# Patient Record
Sex: FEMALE | Race: ASIAN | Employment: OTHER | URBAN - METROPOLITAN AREA
[De-identification: names, ages, dates, MRNs, and addresses within clinical notes are randomized per-mention and may not be internally consistent; named-entity substitution may affect disease eponyms.]

---

## 2017-01-16 RX ORDER — ALENDRONATE SODIUM 70 MG/1
TABLET ORAL
Qty: 12 TABLET | Refills: 0 | Status: SHIPPED | OUTPATIENT
Start: 2017-01-16 | End: 2017-11-27

## 2017-01-18 RX ORDER — ALENDRONATE SODIUM 70 MG/1
TABLET ORAL
Qty: 12 TABLET | Refills: 0 | OUTPATIENT
Start: 2017-01-18

## 2017-01-20 RX ORDER — SERTRALINE HYDROCHLORIDE 25 MG/1
TABLET, FILM COATED ORAL
Qty: 90 TABLET | Refills: 0 | Status: SHIPPED | OUTPATIENT
Start: 2017-01-20 | End: 2017-11-27

## 2017-04-12 RX ORDER — ALENDRONATE SODIUM 70 MG/1
TABLET ORAL
Qty: 12 TABLET | Refills: 0 | Status: SHIPPED | OUTPATIENT
Start: 2017-04-12 | End: 2017-04-19

## 2017-04-18 RX ORDER — SERTRALINE HYDROCHLORIDE 25 MG/1
TABLET, FILM COATED ORAL
Qty: 90 TABLET | Refills: 0 | Status: SHIPPED | OUTPATIENT
Start: 2017-04-18 | End: 2017-04-19

## 2017-04-18 NOTE — TELEPHONE ENCOUNTER
Not protocol medication. LOV 11-14-16. Last refill 1-20-17. Next appointment not yet made. Please see pending Rx. Refill if appropriate. Thanks.

## 2017-04-19 ENCOUNTER — OFFICE VISIT (OUTPATIENT)
Dept: HEMATOLOGY/ONCOLOGY | Facility: HOSPITAL | Age: 62
End: 2017-04-19
Attending: INTERNAL MEDICINE
Payer: COMMERCIAL

## 2017-04-19 VITALS
WEIGHT: 107.63 LBS | OXYGEN SATURATION: 98 % | HEIGHT: 62.01 IN | HEART RATE: 81 BPM | BODY MASS INDEX: 19.56 KG/M2 | DIASTOLIC BLOOD PRESSURE: 61 MMHG | TEMPERATURE: 99 F | RESPIRATION RATE: 18 BRPM | SYSTOLIC BLOOD PRESSURE: 106 MMHG

## 2017-04-19 DIAGNOSIS — M81.0 OSTEOPOROSIS, UNSPECIFIED OSTEOPOROSIS TYPE, UNSPECIFIED PATHOLOGICAL FRACTURE PRESENCE: ICD-10-CM

## 2017-04-19 DIAGNOSIS — C50.212 MALIGNANT NEOPLASM OF UPPER-INNER QUADRANT OF LEFT FEMALE BREAST (HCC): Primary | ICD-10-CM

## 2017-04-19 PROCEDURE — 99213 OFFICE O/P EST LOW 20 MIN: CPT | Performed by: INTERNAL MEDICINE

## 2017-04-19 NOTE — PROGRESS NOTES
[facility] Progress Note      Patient Name:  Deisy Stokes  YOB: 1955  Medical Record Number:  [id number]    Encounter Date: 4/19/2017    CHIEF COMPLAINT: Stage I left breast carcinoma status post lumpectomy     HPI:     64 yea ([id number] [facility]) 2.5 MG Oral Tab TAKE ONE TABLET BY MOUTH ONCE DAILY. Disp: 90 tablet Rfl: 3   Calcium Carbonate-Vitamin D (CALCIUM + D OR) Take 1 tablet by mouth 2 (two) times daily.  Disp:  Rfl:    Multiple Vitamins-Minerals (CENTRUM SILVER ULTRA WOMENS) Oral Tab DIAGNOSTIC BILATERAL (CPT=77066)  XR DEXA BONE DENSITOMETRY (CPT=77080)    Return in about 4 months (around 8/19/2017) for MD. She prefers 4 month follow up even though I offered 6m. Kenji Childers M.D.     [facility]    [facility]

## 2017-04-19 NOTE — PROGRESS NOTES
Pt here for 4 month MD f/u for hx of breast ca. Pt continues on Letrozole daily without complaint. Pt states she is feeling well. Last mammogram done 9/7/16; last Dexa done 8/31/16.      Education Record    Learner:  Patient    Disease / Diagnosis: LB ca

## 2017-07-21 RX ORDER — SERTRALINE HYDROCHLORIDE 25 MG/1
TABLET, FILM COATED ORAL
Qty: 90 TABLET | Refills: 0 | Status: SHIPPED | OUTPATIENT
Start: 2017-07-21 | End: 2017-10-23

## 2017-08-10 RX ORDER — ALENDRONATE SODIUM 70 MG/1
TABLET ORAL
Qty: 12 TABLET | Refills: 0 | Status: SHIPPED | OUTPATIENT
Start: 2017-08-10 | End: 2017-11-27 | Stop reason: ALTCHOICE

## 2017-08-10 RX ORDER — ALENDRONATE SODIUM 70 MG/1
TABLET ORAL
Qty: 4 TABLET | Refills: 0 | Status: SHIPPED | OUTPATIENT
Start: 2017-08-10 | End: 2017-08-10

## 2017-08-16 ENCOUNTER — OFFICE VISIT (OUTPATIENT)
Dept: HEMATOLOGY/ONCOLOGY | Facility: HOSPITAL | Age: 62
End: 2017-08-16
Attending: INTERNAL MEDICINE
Payer: COMMERCIAL

## 2017-08-16 VITALS
WEIGHT: 105.19 LBS | SYSTOLIC BLOOD PRESSURE: 86 MMHG | HEIGHT: 62.01 IN | HEART RATE: 70 BPM | TEMPERATURE: 98 F | RESPIRATION RATE: 18 BRPM | BODY MASS INDEX: 19.11 KG/M2 | OXYGEN SATURATION: 99 % | DIASTOLIC BLOOD PRESSURE: 46 MMHG

## 2017-08-16 DIAGNOSIS — C50.212 MALIGNANT NEOPLASM OF UPPER-INNER QUADRANT OF LEFT BREAST IN FEMALE, ESTROGEN RECEPTOR POSITIVE (HCC): Primary | ICD-10-CM

## 2017-08-16 DIAGNOSIS — Z17.0 MALIGNANT NEOPLASM OF UPPER-INNER QUADRANT OF LEFT BREAST IN FEMALE, ESTROGEN RECEPTOR POSITIVE (HCC): Primary | ICD-10-CM

## 2017-08-16 PROCEDURE — 99213 OFFICE O/P EST LOW 20 MIN: CPT | Performed by: INTERNAL MEDICINE

## 2017-08-16 NOTE — PROGRESS NOTES
[facility] Progress Note      Patient Name:  Robert Reese  YOB: 1955  Medical Record Number:  [id number]    Encounter Date: 8/16/2017    CHIEF COMPLAINT: Stage I left breast carcinoma status post lumpectomy     HPI:     64 timothya TABLET BY MOUTH ONCE DAILY. Disp: 90 tablet Rfl: 3   Calcium Carbonate-Vitamin D (CALCIUM + D OR) Take 1 tablet by mouth 2 (two) times daily.  Disp:  Rfl:    Multiple Vitamins-Minerals (CENTRUM SILVER ULTRA WOMENS) Oral Tab None Entered Disp:  Rfl:        V

## 2017-08-16 NOTE — PROGRESS NOTES
Patient here for 4 month follow up. Patient states, \"no changes. \" Instructed patient to change into gown.     Education Record    Learner:  Patient    Disease / Diagnosis: Breast CA    Barriers / Limitations:  None   Comments:    Method:  Brief focused

## 2017-09-01 ENCOUNTER — TELEPHONE (OUTPATIENT)
Dept: HEMATOLOGY/ONCOLOGY | Facility: HOSPITAL | Age: 62
End: 2017-09-01

## 2017-09-01 ENCOUNTER — HOSPITAL ENCOUNTER (OUTPATIENT)
Dept: BONE DENSITY | Age: 62
Discharge: HOME | End: 2017-09-01
Attending: INTERNAL MEDICINE
Payer: COMMERCIAL

## 2017-09-01 DIAGNOSIS — M81.0 OSTEOPOROSIS, UNSPECIFIED OSTEOPOROSIS TYPE, UNSPECIFIED PATHOLOGICAL FRACTURE PRESENCE: ICD-10-CM

## 2017-09-01 DIAGNOSIS — C50.212 MALIGNANT NEOPLASM OF UPPER-INNER QUADRANT OF LEFT FEMALE BREAST (HCC): ICD-10-CM

## 2017-09-01 PROCEDURE — 77080 DXA BONE DENSITY AXIAL: CPT | Performed by: INTERNAL MEDICINE

## 2017-09-01 NOTE — TELEPHONE ENCOUNTER
Nikhil Ruby MD  [facility]             Call, DEXA stable      Left VM with results.

## 2017-09-07 ENCOUNTER — HOSPITAL ENCOUNTER (OUTPATIENT)
Dept: MAMMOGRAPHY | Facility: HOSPITAL | Age: 62
Discharge: HOME | End: 2017-09-07
Attending: INTERNAL MEDICINE
Payer: COMMERCIAL

## 2017-09-07 ENCOUNTER — TELEPHONE (OUTPATIENT)
Dept: HEMATOLOGY/ONCOLOGY | Facility: HOSPITAL | Age: 62
End: 2017-09-07

## 2017-09-07 DIAGNOSIS — M81.0 OSTEOPOROSIS, UNSPECIFIED OSTEOPOROSIS TYPE, UNSPECIFIED PATHOLOGICAL FRACTURE PRESENCE: ICD-10-CM

## 2017-09-07 DIAGNOSIS — C50.212 MALIGNANT NEOPLASM OF UPPER-INNER QUADRANT OF LEFT FEMALE BREAST (HCC): ICD-10-CM

## 2017-09-07 PROCEDURE — 77066 DX MAMMO INCL CAD BI: CPT | Performed by: INTERNAL MEDICINE

## 2017-09-07 NOTE — TELEPHONE ENCOUNTER
Ankit Roa MD  [facility] [address] [facility]             Call, claudy sharma      Pt informed.

## 2017-10-23 RX ORDER — SERTRALINE HYDROCHLORIDE 25 MG/1
TABLET, FILM COATED ORAL
Qty: 90 TABLET | Refills: 0 | Status: SHIPPED | OUTPATIENT
Start: 2017-10-23 | End: 2017-11-27

## 2017-10-23 NOTE — TELEPHONE ENCOUNTER
This was in Dr Linn swartz. Pt sees you. Has appt 11/27  I changed Ovidio Fernandes to you if you agree.

## 2017-11-27 ENCOUNTER — OFFICE VISIT (OUTPATIENT)
Dept: FAMILY MEDICINE CLINIC | Facility: CLINIC | Age: 62
End: 2017-11-27

## 2017-11-27 VITALS
SYSTOLIC BLOOD PRESSURE: 102 MMHG | BODY MASS INDEX: 19.88 KG/M2 | DIASTOLIC BLOOD PRESSURE: 48 MMHG | HEART RATE: 70 BPM | WEIGHT: 108 LBS | TEMPERATURE: 98 F | HEIGHT: 62 IN | RESPIRATION RATE: 16 BRPM

## 2017-11-27 DIAGNOSIS — Z13.89 SCREENING FOR GENITOURINARY CONDITION: ICD-10-CM

## 2017-11-27 DIAGNOSIS — E55.9 VITAMIN D DEFICIENCY: ICD-10-CM

## 2017-11-27 DIAGNOSIS — Z00.00 PHYSICAL EXAM, ANNUAL: Primary | ICD-10-CM

## 2017-11-27 DIAGNOSIS — Z01.419 CERVICAL SMEAR, AS PART OF ROUTINE GYNECOLOGICAL EXAMINATION: ICD-10-CM

## 2017-11-27 DIAGNOSIS — Z12.11 SCREEN FOR COLON CANCER: ICD-10-CM

## 2017-11-27 DIAGNOSIS — Z00.00 LABORATORY EXAMINATION ORDERED AS PART OF A ROUTINE GENERAL MEDICAL EXAMINATION: ICD-10-CM

## 2017-11-27 PROCEDURE — 88175 CYTOPATH C/V AUTO FLUID REDO: CPT | Performed by: FAMILY MEDICINE

## 2017-11-27 PROCEDURE — 99396 PREV VISIT EST AGE 40-64: CPT | Performed by: FAMILY MEDICINE

## 2017-11-27 PROCEDURE — 87624 HPV HI-RISK TYP POOLED RSLT: CPT | Performed by: FAMILY MEDICINE

## 2017-11-27 PROCEDURE — 81003 URINALYSIS AUTO W/O SCOPE: CPT | Performed by: FAMILY MEDICINE

## 2017-11-27 RX ORDER — SERTRALINE HYDROCHLORIDE 25 MG/1
25 TABLET, FILM COATED ORAL
Qty: 90 TABLET | Refills: 1 | Status: SHIPPED | OUTPATIENT
Start: 2017-11-27 | End: 2018-08-25

## 2017-11-27 RX ORDER — ALENDRONATE SODIUM 70 MG/1
TABLET ORAL
Qty: 12 TABLET | Refills: 0 | OUTPATIENT
Start: 2017-11-27

## 2017-11-27 RX ORDER — ALENDRONATE SODIUM 70 MG/1
TABLET ORAL
Qty: 12 TABLET | Refills: 1 | Status: SHIPPED | OUTPATIENT
Start: 2017-11-27 | End: 2018-05-13

## 2017-11-27 NOTE — PATIENT INSTRUCTIONS
Healthy diet. Stay active. Continue current medications. Return in 6 months for medication check. Do test for occult blood from the stool. Call GI for colonoscopy.

## 2017-11-27 NOTE — PROGRESS NOTES
HPI:   Joan Quispe is a 58year old female who presents for a complete physical exam. Symptoms: denies discharge, itching, burning or dysuria. Patient has no complains . Needs refill of her medications doing well on them.          Immunization Histor OR Take by mouth. Disp:  Rfl:    Sertraline HCl (ZOLOFT) 25 MG Oral Tab Take 1 tablet (25 mg total) by mouth once daily. Disp: 90 tablet Rfl: 1   letrozole (FEMARA) 2.5 MG Oral Tab TAKE ONE TABLET BY MOUTH ONCE DAILY.  Disp: 90 tablet Rfl: 3   Calcium Carbo denies dysuria, vaginal discharge or itching, is menopausal.  MUSCULOSKELETAL: denies back pain  NEURO: denies headaches  PSYCHE: denies depression or anxiety  HEMATOLOGIC: denies hx of anemia  ENDOCRINE: denies thyroid history  ALL/ASTHMA: denies hx of al EVERY WEEK      Sertraline HCl 25 MG Oral Tab 90 tablet 1      Sig: Take 1 tablet (25 mg total) by mouth once daily. Healthy diet. Stay active. Continue current medications. Return in 6 months for medication check.   Do test for occult blood from t

## 2017-12-01 ENCOUNTER — LAB ENCOUNTER (OUTPATIENT)
Dept: LAB | Age: 62
End: 2017-12-01
Attending: FAMILY MEDICINE
Payer: COMMERCIAL

## 2017-12-01 DIAGNOSIS — Z00.00 LABORATORY EXAMINATION ORDERED AS PART OF A ROUTINE GENERAL MEDICAL EXAMINATION: ICD-10-CM

## 2017-12-01 DIAGNOSIS — E55.9 VITAMIN D DEFICIENCY: ICD-10-CM

## 2017-12-01 PROCEDURE — 80061 LIPID PANEL: CPT | Performed by: FAMILY MEDICINE

## 2017-12-01 PROCEDURE — 36415 COLL VENOUS BLD VENIPUNCTURE: CPT | Performed by: FAMILY MEDICINE

## 2017-12-01 PROCEDURE — 82306 VITAMIN D 25 HYDROXY: CPT | Performed by: FAMILY MEDICINE

## 2017-12-01 PROCEDURE — 80050 GENERAL HEALTH PANEL: CPT | Performed by: FAMILY MEDICINE

## 2017-12-11 ENCOUNTER — OFFICE VISIT (OUTPATIENT)
Dept: FAMILY MEDICINE CLINIC | Facility: CLINIC | Age: 62
End: 2017-12-11

## 2017-12-11 VITALS
HEART RATE: 82 BPM | DIASTOLIC BLOOD PRESSURE: 60 MMHG | TEMPERATURE: 99 F | BODY MASS INDEX: 19.88 KG/M2 | HEIGHT: 62 IN | SYSTOLIC BLOOD PRESSURE: 98 MMHG | WEIGHT: 108 LBS | RESPIRATION RATE: 16 BRPM

## 2017-12-11 DIAGNOSIS — M65.311 TRIGGER FINGER OF RIGHT THUMB: Primary | ICD-10-CM

## 2017-12-11 DIAGNOSIS — M79.644 PAIN OF RIGHT THUMB: ICD-10-CM

## 2017-12-11 PROCEDURE — 99213 OFFICE O/P EST LOW 20 MIN: CPT | Performed by: FAMILY MEDICINE

## 2017-12-11 NOTE — PROGRESS NOTES
Yonny Healy is a 58year old female. Cc right thumb pain  HPI:   Patient is coming to the office for evaluation of the right thumb pain which started after Thanksgiving she was peeling potatoes at that time. The pain is at the base of the thumb.   Wo Position: Sitting, Cuff Size: adult)   Pulse 82   Temp 98.6 °F (37 °C) (Oral)   Resp 16   Ht 62\"   Wt 108 lb   Breastfeeding?  No   BMI 19.75 kg/m²   GENERAL: well developed, well nourished,in no apparent distress  SKIN: no rashes,no suspicious lesions  HE

## 2017-12-13 ENCOUNTER — OFFICE VISIT (OUTPATIENT)
Dept: HEMATOLOGY/ONCOLOGY | Facility: HOSPITAL | Age: 62
End: 2017-12-13
Attending: INTERNAL MEDICINE
Payer: COMMERCIAL

## 2017-12-13 VITALS
WEIGHT: 109.19 LBS | SYSTOLIC BLOOD PRESSURE: 101 MMHG | RESPIRATION RATE: 18 BRPM | OXYGEN SATURATION: 97 % | TEMPERATURE: 98 F | HEART RATE: 83 BPM | HEIGHT: 62.01 IN | DIASTOLIC BLOOD PRESSURE: 66 MMHG | BODY MASS INDEX: 19.84 KG/M2

## 2017-12-13 DIAGNOSIS — Z17.0 MALIGNANT NEOPLASM OF UPPER-INNER QUADRANT OF LEFT BREAST IN FEMALE, ESTROGEN RECEPTOR POSITIVE (HCC): Primary | ICD-10-CM

## 2017-12-13 DIAGNOSIS — C50.212 MALIGNANT NEOPLASM OF UPPER-INNER QUADRANT OF LEFT BREAST IN FEMALE, ESTROGEN RECEPTOR POSITIVE (HCC): Primary | ICD-10-CM

## 2017-12-13 PROBLEM — M65.311 TRIGGER FINGER OF RIGHT THUMB: Status: ACTIVE | Noted: 2017-12-13

## 2017-12-13 PROCEDURE — 99213 OFFICE O/P EST LOW 20 MIN: CPT | Performed by: INTERNAL MEDICINE

## 2017-12-13 NOTE — PROGRESS NOTES
Pt her for 4 month MD f/u for h/o LB ca. Pt states she feels well and denies any complaints at this time.      Education Record    Learner:  Patient    Disease / Diagnosis:    Barriers / Limitations:  None   Comments:    Method:  Discussion   Comments:    KAYLENE

## 2017-12-13 NOTE — PROGRESS NOTES
[facility] Progress Note      Patient Name:  Nirmala Myrick  YOB: 1955  Medical Record Number:  [id number]    Encounter Date: 12/13/2017    CHIEF COMPLAINT: Stage I left breast carcinoma status post lumpectomy     HPI:     58 ye 1.48 sq meters (1002)  Pulse: 83 (1002)  BP: 101/66 (1002)  Temp: 98.4 °F (36.9 °C) (1002)  Do Not Use - Resp Rate: --  SpO2: 97 % (1002)    PS:  ECO    PHYSICAL EXAM:    General: alert and oriented x 3, not in acute distr

## 2018-01-22 RX ORDER — SERTRALINE HYDROCHLORIDE 25 MG/1
TABLET, FILM COATED ORAL
Qty: 90 TABLET | Refills: 0 | OUTPATIENT
Start: 2018-01-22

## 2018-04-11 ENCOUNTER — OFFICE VISIT (OUTPATIENT)
Dept: HEMATOLOGY/ONCOLOGY | Facility: HOSPITAL | Age: 63
End: 2018-04-11
Attending: INTERNAL MEDICINE
Payer: COMMERCIAL

## 2018-04-11 VITALS
WEIGHT: 109 LBS | RESPIRATION RATE: 18 BRPM | HEART RATE: 85 BPM | BODY MASS INDEX: 20 KG/M2 | SYSTOLIC BLOOD PRESSURE: 97 MMHG | TEMPERATURE: 98 F | OXYGEN SATURATION: 96 % | DIASTOLIC BLOOD PRESSURE: 58 MMHG

## 2018-04-11 DIAGNOSIS — Z17.0 MALIGNANT NEOPLASM OF UPPER-INNER QUADRANT OF LEFT BREAST IN FEMALE, ESTROGEN RECEPTOR POSITIVE (HCC): Primary | ICD-10-CM

## 2018-04-11 DIAGNOSIS — C50.212 MALIGNANT NEOPLASM OF UPPER-INNER QUADRANT OF LEFT BREAST IN FEMALE, ESTROGEN RECEPTOR POSITIVE (HCC): Primary | ICD-10-CM

## 2018-04-11 DIAGNOSIS — M81.8 OTHER OSTEOPOROSIS, UNSPECIFIED PATHOLOGICAL FRACTURE PRESENCE: ICD-10-CM

## 2018-04-11 PROCEDURE — 99213 OFFICE O/P EST LOW 20 MIN: CPT | Performed by: INTERNAL MEDICINE

## 2018-04-11 NOTE — PROGRESS NOTES
Patient here for 4 month follow up. Patient feeling good, no changes. Patient instructed to change into a gown.      Education Record    Learner:  Patient    Disease / Diagnosis: Breast    Barriers / Limitations:  None   Comments:    Method:  Discussion   C

## 2018-04-11 NOTE — PROGRESS NOTES
[facility] Progress Note      Patient Name:  Pari Dixon  YOB: 1955  Medical Record Number:  [id number]    Encounter Date: 4/11/2018    CHIEF COMPLAINT: Stage I left breast carcinoma status post lumpectomy     HPI:     58 yea --  SpO2: --    PS:  ECO    PHYSICAL EXAM:    General: alert and oriented x 3, not in acute distress. HEENT: JAMES, oropharynx  clear. Neck: supple. No JVD /adenopathy. CVS: S1S2, regular  Rhythm, no murmurs.    Lungs: Clear to auscultation and perc

## 2018-05-14 RX ORDER — ALENDRONATE SODIUM 70 MG/1
TABLET ORAL
Qty: 12 TABLET | Refills: 0 | Status: SHIPPED | OUTPATIENT
Start: 2018-05-14 | End: 2018-08-04

## 2018-08-06 RX ORDER — ALENDRONATE SODIUM 70 MG/1
TABLET ORAL
Qty: 12 TABLET | Refills: 0 | Status: SHIPPED | OUTPATIENT
Start: 2018-08-06 | End: 2018-10-25

## 2018-08-15 ENCOUNTER — OFFICE VISIT (OUTPATIENT)
Dept: HEMATOLOGY/ONCOLOGY | Facility: HOSPITAL | Age: 63
End: 2018-08-15
Attending: INTERNAL MEDICINE
Payer: COMMERCIAL

## 2018-08-15 VITALS
BODY MASS INDEX: 19.62 KG/M2 | TEMPERATURE: 98 F | DIASTOLIC BLOOD PRESSURE: 67 MMHG | WEIGHT: 108 LBS | OXYGEN SATURATION: 98 % | HEIGHT: 62.01 IN | RESPIRATION RATE: 18 BRPM | HEART RATE: 83 BPM | SYSTOLIC BLOOD PRESSURE: 104 MMHG

## 2018-08-15 DIAGNOSIS — Z17.0 MALIGNANT NEOPLASM OF UPPER-INNER QUADRANT OF LEFT BREAST IN FEMALE, ESTROGEN RECEPTOR POSITIVE (HCC): Primary | ICD-10-CM

## 2018-08-15 DIAGNOSIS — M89.9 BONE DISORDER: ICD-10-CM

## 2018-08-15 DIAGNOSIS — C50.212 MALIGNANT NEOPLASM OF UPPER-INNER QUADRANT OF LEFT BREAST IN FEMALE, ESTROGEN RECEPTOR POSITIVE (HCC): Primary | ICD-10-CM

## 2018-08-15 PROCEDURE — 99213 OFFICE O/P EST LOW 20 MIN: CPT | Performed by: INTERNAL MEDICINE

## 2018-08-15 NOTE — PROGRESS NOTES
[facility] Progress Note      Patient Name:  Digna Zaman  YOB: 1955  Medical Record Number:  [id number]    Encounter Date: 8/15/2018    CHIEF COMPLAINT: Stage I left breast carcinoma status post lumpectomy     HPI:     58 yea )  BSA (Calculated - sq m): 1.47 sq meters (08/15 1004)  Pulse: 83 (08/15 1004)  BP: 104/67 (08/15 1004)  Temp: 98.4 °F (36.9 °C) (08/15 1004)  Do Not Use - Resp Rate: --  SpO2: 98 % (08/15 1004)    PS:  ECO    PHYSICAL EXAM:    General: alert and

## 2018-08-15 NOTE — PROGRESS NOTES
4 month MD f/u for h/o LB ca. Pt denies any concerns or complaints at this time.    Outpatient Oncology Care Plan  Problem list:  knowledge deficit    Problems related to:    disease/disease progression    Interventions:  provided general teaching    Expect

## 2018-08-27 RX ORDER — SERTRALINE HYDROCHLORIDE 25 MG/1
TABLET, FILM COATED ORAL
Qty: 30 TABLET | Refills: 1 | Status: SHIPPED | OUTPATIENT
Start: 2018-08-27 | End: 2018-11-04

## 2018-08-27 NOTE — TELEPHONE ENCOUNTER
SERTRALINE 25MG TABLETS    Not a per protocol medication. Rx is pending for your approval.    Please sign,     Thank you    Her next appt is on 11/23/2108.

## 2018-08-29 RX ORDER — SERTRALINE HYDROCHLORIDE 25 MG/1
TABLET, FILM COATED ORAL
Qty: 90 TABLET | Refills: 1 | OUTPATIENT
Start: 2018-08-29

## 2018-09-10 ENCOUNTER — HOSPITAL ENCOUNTER (OUTPATIENT)
Dept: MAMMOGRAPHY | Facility: HOSPITAL | Age: 63
Discharge: HOME | End: 2018-09-10
Attending: INTERNAL MEDICINE
Payer: COMMERCIAL

## 2018-09-10 DIAGNOSIS — Z17.0 MALIGNANT NEOPLASM OF UPPER-INNER QUADRANT OF LEFT BREAST IN FEMALE, ESTROGEN RECEPTOR POSITIVE (HCC): ICD-10-CM

## 2018-09-10 DIAGNOSIS — C50.212 MALIGNANT NEOPLASM OF UPPER-INNER QUADRANT OF LEFT BREAST IN FEMALE, ESTROGEN RECEPTOR POSITIVE (HCC): ICD-10-CM

## 2018-09-10 DIAGNOSIS — M81.8 OTHER OSTEOPOROSIS, UNSPECIFIED PATHOLOGICAL FRACTURE PRESENCE: ICD-10-CM

## 2018-09-10 PROCEDURE — 77062 BREAST TOMOSYNTHESIS BI: CPT | Performed by: INTERNAL MEDICINE

## 2018-09-10 PROCEDURE — 77066 DX MAMMO INCL CAD BI: CPT | Performed by: INTERNAL MEDICINE

## 2018-09-11 ENCOUNTER — TELEPHONE (OUTPATIENT)
Dept: HEMATOLOGY/ONCOLOGY | Facility: HOSPITAL | Age: 63
End: 2018-09-11

## 2018-09-11 NOTE — TELEPHONE ENCOUNTER
Diana Burdick MD  [facility]             Call, claudy sharma, repeat 1 yr      Unable to reach pt by phone. Left VM with MD orders and requested a call back with any questions.

## 2018-10-25 RX ORDER — ALENDRONATE SODIUM 70 MG/1
TABLET ORAL
Qty: 12 TABLET | Refills: 0 | Status: SHIPPED | OUTPATIENT
Start: 2018-10-25 | End: 2019-02-22

## 2018-11-07 RX ORDER — SERTRALINE HYDROCHLORIDE 25 MG/1
TABLET, FILM COATED ORAL
Qty: 30 TABLET | Refills: 0 | Status: SHIPPED | OUTPATIENT
Start: 2018-11-07 | End: 2018-12-12

## 2018-11-07 NOTE — TELEPHONE ENCOUNTER
SERTRALINE 25MG TABLETS    Non protocol medication. The pt has an upcoming appt on 11/28/2018. Please see pended medications. Please sign if appropriate.       Thank you

## 2018-11-28 ENCOUNTER — OFFICE VISIT (OUTPATIENT)
Dept: FAMILY MEDICINE CLINIC | Facility: CLINIC | Age: 63
End: 2018-11-28
Payer: COMMERCIAL

## 2018-11-28 VITALS
SYSTOLIC BLOOD PRESSURE: 96 MMHG | DIASTOLIC BLOOD PRESSURE: 52 MMHG | HEIGHT: 62 IN | BODY MASS INDEX: 19.88 KG/M2 | TEMPERATURE: 98 F | HEART RATE: 78 BPM | WEIGHT: 108 LBS | RESPIRATION RATE: 16 BRPM

## 2018-11-28 DIAGNOSIS — Z00.00 PHYSICAL EXAM, ANNUAL: Primary | ICD-10-CM

## 2018-11-28 DIAGNOSIS — Z13.89 SCREENING FOR GENITOURINARY CONDITION: ICD-10-CM

## 2018-11-28 DIAGNOSIS — Z78.9 VEGETARIAN DIET: ICD-10-CM

## 2018-11-28 DIAGNOSIS — E55.9 VITAMIN D DEFICIENCY: ICD-10-CM

## 2018-11-28 DIAGNOSIS — Z00.00 LABORATORY EXAMINATION ORDERED AS PART OF A ROUTINE GENERAL MEDICAL EXAMINATION: ICD-10-CM

## 2018-11-28 DIAGNOSIS — Z12.11 SCREEN FOR COLON CANCER: ICD-10-CM

## 2018-11-28 PROCEDURE — 99396 PREV VISIT EST AGE 40-64: CPT | Performed by: FAMILY MEDICINE

## 2018-11-28 NOTE — PROGRESS NOTES
HPI:   Joan Quispe is a 61year old female who presents for a complete physical exam. Symptoms: denies discharge, itching, burning or dysuria. Patient has no complains . Needs refill of her medications doing well on them.    Pt is vegetarian did not Oral Tab TAKE 1 TABLET BY MOUTH EVERY DAY Disp: 30 tablet Rfl: 0   ALENDRONATE 70 MG Oral Tab TAKE 1 TABLET BY MOUTH EVERY WEEK Disp: 12 tablet Rfl: 0   BIOTIN OR Take by mouth.  Disp:  Rfl:    Calcium Carbonate-Vitamin D (CALCIUM + D OR) Take 1 tablet by m menopausal.  MUSCULOSKELETAL: denies back pain  NEURO: denies headaches  PSYCHE: denies depression or anxiety  HEMATOLOGIC: denies hx of anemia  ENDOCRINE: denies thyroid history  ALL/ASTHMA: denies hx of allergy or asthma    EXAM:   BP 96/52 (BP Location: Consults:  None   Pap and pelvic done. Claudio Willett done by oncologist.  Self breast exam explained. Health maintenance, will check fasting Lipids, CMP, and CBC. Pt referred for screening colonoscopy. Pt' s weight is Body mass index is 19.75 kg/m². , recommended

## 2018-11-28 NOTE — PATIENT INSTRUCTIONS
Healthy diet. Stay active. Check if your insurance covers vitamin D level for vitamin D deficiency and vitamin B12 level  for diagnosis of vegetarian diet.

## 2018-11-30 RX ORDER — SERTRALINE HYDROCHLORIDE 25 MG/1
TABLET, FILM COATED ORAL
Qty: 90 TABLET | Refills: 0 | Status: SHIPPED | OUTPATIENT
Start: 2018-11-30 | End: 2018-12-30

## 2018-11-30 NOTE — TELEPHONE ENCOUNTER
SERTRALINE 25MG TABLETS    Non protocol medication. Please see pended medications. Please sign if appropriate.       Thank you

## 2018-12-04 ENCOUNTER — LAB ENCOUNTER (OUTPATIENT)
Dept: LAB | Age: 63
End: 2018-12-04
Attending: FAMILY MEDICINE
Payer: COMMERCIAL

## 2018-12-04 DIAGNOSIS — Z13.89 SCREENING FOR GENITOURINARY CONDITION: ICD-10-CM

## 2018-12-04 DIAGNOSIS — Z78.9 VEGETARIAN DIET: ICD-10-CM

## 2018-12-04 DIAGNOSIS — Z00.00 LABORATORY EXAMINATION ORDERED AS PART OF A ROUTINE GENERAL MEDICAL EXAMINATION: ICD-10-CM

## 2018-12-04 DIAGNOSIS — E55.9 VITAMIN D DEFICIENCY: ICD-10-CM

## 2018-12-04 PROCEDURE — 80050 GENERAL HEALTH PANEL: CPT | Performed by: FAMILY MEDICINE

## 2018-12-04 PROCEDURE — 82607 VITAMIN B-12: CPT | Performed by: FAMILY MEDICINE

## 2018-12-04 PROCEDURE — 81001 URINALYSIS AUTO W/SCOPE: CPT | Performed by: FAMILY MEDICINE

## 2018-12-04 PROCEDURE — 80061 LIPID PANEL: CPT | Performed by: FAMILY MEDICINE

## 2018-12-04 PROCEDURE — 82306 VITAMIN D 25 HYDROXY: CPT | Performed by: FAMILY MEDICINE

## 2018-12-04 PROCEDURE — 36415 COLL VENOUS BLD VENIPUNCTURE: CPT | Performed by: FAMILY MEDICINE

## 2018-12-11 NOTE — PROGRESS NOTES
[facility] Progress Note      Patient Name:  Nirmala Myrick  YOB: 1955  Medical Record Number:  [id number]    Encounter Date: 12/12/2018    CHIEF COMPLAINT: Stage I left breast carcinoma status post lumpectomy     HPI:     61 ye (Calculated - sq m): 1.48 sq meters (1001)  Pulse: 74 (1001)  BP: 94/61 (1001)  Temp: 98.1 °F (36.7 °C) (1001)  Do Not Use - Resp Rate: --  SpO2: 95 % (1001)    PS:  ECO    PHYSICAL EXAM:    General: alert and oriented x 3

## 2018-12-12 ENCOUNTER — OFFICE VISIT (OUTPATIENT)
Dept: HEMATOLOGY/ONCOLOGY | Facility: HOSPITAL | Age: 63
End: 2018-12-12
Attending: INTERNAL MEDICINE
Payer: COMMERCIAL

## 2018-12-12 VITALS
SYSTOLIC BLOOD PRESSURE: 94 MMHG | OXYGEN SATURATION: 95 % | HEART RATE: 74 BPM | DIASTOLIC BLOOD PRESSURE: 61 MMHG | BODY MASS INDEX: 19.8 KG/M2 | TEMPERATURE: 98 F | HEIGHT: 62.01 IN | RESPIRATION RATE: 18 BRPM | WEIGHT: 109 LBS

## 2018-12-12 DIAGNOSIS — M89.9 BONE DISORDER: ICD-10-CM

## 2018-12-12 DIAGNOSIS — Z17.0 MALIGNANT NEOPLASM OF UPPER-INNER QUADRANT OF LEFT BREAST IN FEMALE, ESTROGEN RECEPTOR POSITIVE (HCC): Primary | ICD-10-CM

## 2018-12-12 DIAGNOSIS — C50.212 MALIGNANT NEOPLASM OF UPPER-INNER QUADRANT OF LEFT BREAST IN FEMALE, ESTROGEN RECEPTOR POSITIVE (HCC): Primary | ICD-10-CM

## 2018-12-12 PROCEDURE — 99213 OFFICE O/P EST LOW 20 MIN: CPT | Performed by: INTERNAL MEDICINE

## 2019-01-02 RX ORDER — SERTRALINE HYDROCHLORIDE 25 MG/1
TABLET, FILM COATED ORAL
Qty: 30 TABLET | Refills: 0 | Status: SHIPPED | OUTPATIENT
Start: 2019-01-02 | End: 2019-02-04

## 2019-01-02 NOTE — TELEPHONE ENCOUNTER
SERTRALINE 25MG TABLETS    Non protocol medication. Please see pended medications. Please sign if appropriate. Thank you    Her last OV was on 11/28/2018. Her last refill was on 11/30/2018 for 90 tabs.

## 2019-02-04 RX ORDER — SERTRALINE HYDROCHLORIDE 25 MG/1
TABLET, FILM COATED ORAL
Qty: 30 TABLET | Refills: 0 | Status: SHIPPED | OUTPATIENT
Start: 2019-02-04 | End: 2019-03-13

## 2019-02-04 NOTE — TELEPHONE ENCOUNTER
Not protocol medication. LOV :11/28/18 physical   Last labs done :12/04/18  Next appointment :not yet made   Please see pending medication. Refill if appropriate.    Last refill:    Date:1/9/19   Amount :30 tablets no refills   Medication: sertraline hcl

## 2019-02-22 RX ORDER — ALENDRONATE SODIUM 70 MG/1
TABLET ORAL
Qty: 12 TABLET | Refills: 0 | Status: SHIPPED | OUTPATIENT
Start: 2019-02-22 | End: 2019-05-17

## 2019-03-08 NOTE — TELEPHONE ENCOUNTER
SERTRALINE 25MG TABLETS    Please call the pt and inform her that she is due for her medication visit. Her last medication OV was about a year ago.

## 2019-03-13 PROBLEM — F43.9 STRESS: Status: ACTIVE | Noted: 2019-03-13

## 2019-03-13 PROBLEM — E78.00 HYPERCHOLESTEREMIA: Status: ACTIVE | Noted: 2019-03-13

## 2019-03-13 RX ORDER — SERTRALINE HYDROCHLORIDE 25 MG/1
TABLET, FILM COATED ORAL
Qty: 30 TABLET | Refills: 0 | OUTPATIENT
Start: 2019-03-13

## 2019-03-14 NOTE — PROGRESS NOTES
Jhonny Martinez is a 61year old female. cc, hypercholesterolemia, osteoporosis, grieving, stress  HPI:   Patient is coming to the office for follow-up on sertraline. Medication was started because of the stress.   Patient is having history of left breast Smokeless tobacco: Never Used    Alcohol use:  Yes      Alcohol/week: 0.0 oz      Comment: rare wine    Drug use: No       REVIEW OF SYSTEMS:   GENERAL HEALTH: feels well otherwise  SKIN: denies any unusual skin lesions or rashes  HEENT no congestion no run Result Value Ref Range    Cholesterol, Total 220 (H) <200 mg/dL    HDL Cholesterol 65 (H) 40 - 59 mg/dL    Triglycerides 219 (H) 30 - 149 mg/dL    LDL Cholesterol 111 (H) <100 mg/dL    VLDL 44 (H) 0 - 30 mg/dL    Non HDL Chol 155 (H) <130 mg/dL   URINALY Basophil Absolute 0.05 0.00 - 0.10 x10(3) uL    Immature Granulocyte Absolute 0.04 0.00 - 1.00 x10(3) uL    Neutrophil % 60.2 %    Lymphocyte % 20.5 %    Monocyte % 8.5 %    Eosinophil % 9.7 %    Basophil % 0.6 %    Immature Granulocyte % 0.5 %       AS

## 2019-04-09 NOTE — PROGRESS NOTES
[facility] Progress Note      Patient Name:  Joey Viera  YOB: 1955  Medical Record Number:  [id number]    Encounter Date: 4/10/2019    CHIEF COMPLAINT: Stage I left breast carcinoma status post lumpectomy     HPI:     61 yanelis 5138)  Pulse: 72 (04/10 0957)  BP: 86/55 (04/10 0957)  Temp: 97.1 °F (36.2 °C) (04/10 0957)  Do Not Use - Resp Rate: --  SpO2: 98 % (04/10 0957)    PS:  ECO    PHYSICAL EXAM:    General: alert and oriented x 3, not in acute distress.   HEENT: raghav RAMIREZ

## 2019-04-10 ENCOUNTER — OFFICE VISIT (OUTPATIENT)
Dept: HEMATOLOGY/ONCOLOGY | Facility: HOSPITAL | Age: 64
End: 2019-04-10
Attending: INTERNAL MEDICINE
Payer: COMMERCIAL

## 2019-04-10 VITALS
HEART RATE: 72 BPM | RESPIRATION RATE: 16 BRPM | OXYGEN SATURATION: 98 % | DIASTOLIC BLOOD PRESSURE: 55 MMHG | HEIGHT: 62.01 IN | BODY MASS INDEX: 19.51 KG/M2 | TEMPERATURE: 97 F | SYSTOLIC BLOOD PRESSURE: 86 MMHG | WEIGHT: 107.38 LBS

## 2019-04-10 DIAGNOSIS — Z17.0 MALIGNANT NEOPLASM OF UPPER-INNER QUADRANT OF LEFT BREAST IN FEMALE, ESTROGEN RECEPTOR POSITIVE (HCC): Primary | ICD-10-CM

## 2019-04-10 DIAGNOSIS — C50.212 MALIGNANT NEOPLASM OF UPPER-INNER QUADRANT OF LEFT BREAST IN FEMALE, ESTROGEN RECEPTOR POSITIVE (HCC): Primary | ICD-10-CM

## 2019-04-10 DIAGNOSIS — M81.0 OSTEOPOROSIS, UNSPECIFIED OSTEOPOROSIS TYPE, UNSPECIFIED PATHOLOGICAL FRACTURE PRESENCE: ICD-10-CM

## 2019-04-10 PROCEDURE — 99213 OFFICE O/P EST LOW 20 MIN: CPT | Performed by: INTERNAL MEDICINE

## 2019-05-17 RX ORDER — ALENDRONATE SODIUM 70 MG/1
TABLET ORAL
Qty: 12 TABLET | Refills: 0 | Status: SHIPPED | OUTPATIENT
Start: 2019-05-17 | End: 2019-08-10

## 2019-08-06 NOTE — PROGRESS NOTES
[facility] Progress Note      Patient Name:  Joan Quispe  YOB: 1955  Medical Record Number:  [id number]    Encounter Date: 8/7/2019    CHIEF COMPLAINT: Stage I left breast carcinoma status post lumpectomy     HPI:     61 year )  Pulse: 66 (1133)  BP: 106/71 (1133)  Temp: 98.5 °F (36.9 °C) (1133)  Do Not Use - Resp Rate: --  SpO2: 100 % (1133)    PS:  ECO    PHYSICAL EXAM:    General: alert and oriented x 3, not in acute distress.   HEENT: justo RAMIREZ

## 2019-08-07 ENCOUNTER — OFFICE VISIT (OUTPATIENT)
Dept: HEMATOLOGY/ONCOLOGY | Facility: HOSPITAL | Age: 64
End: 2019-08-07
Attending: INTERNAL MEDICINE
Payer: COMMERCIAL

## 2019-08-07 VITALS
HEART RATE: 66 BPM | RESPIRATION RATE: 12 BRPM | HEIGHT: 62.01 IN | WEIGHT: 104 LBS | TEMPERATURE: 99 F | BODY MASS INDEX: 18.9 KG/M2 | SYSTOLIC BLOOD PRESSURE: 106 MMHG | OXYGEN SATURATION: 100 % | DIASTOLIC BLOOD PRESSURE: 71 MMHG

## 2019-08-07 DIAGNOSIS — Z17.0 MALIGNANT NEOPLASM OF UPPER-INNER QUADRANT OF LEFT BREAST IN FEMALE, ESTROGEN RECEPTOR POSITIVE (HCC): Primary | ICD-10-CM

## 2019-08-07 DIAGNOSIS — M81.0 OSTEOPOROSIS, UNSPECIFIED OSTEOPOROSIS TYPE, UNSPECIFIED PATHOLOGICAL FRACTURE PRESENCE: ICD-10-CM

## 2019-08-07 DIAGNOSIS — C50.212 MALIGNANT NEOPLASM OF UPPER-INNER QUADRANT OF LEFT BREAST IN FEMALE, ESTROGEN RECEPTOR POSITIVE (HCC): Primary | ICD-10-CM

## 2019-08-07 PROCEDURE — 99213 OFFICE O/P EST LOW 20 MIN: CPT | Performed by: INTERNAL MEDICINE

## 2019-08-10 RX ORDER — ALENDRONATE SODIUM 70 MG/1
TABLET ORAL
Qty: 12 TABLET | Refills: 0 | Status: SHIPPED | OUTPATIENT
Start: 2019-08-10 | End: 2019-11-08

## 2019-09-05 ENCOUNTER — HOSPITAL ENCOUNTER (OUTPATIENT)
Dept: BONE DENSITY | Age: 64
Discharge: HOME | End: 2019-09-05
Attending: INTERNAL MEDICINE
Payer: COMMERCIAL

## 2019-09-05 DIAGNOSIS — M81.0 OSTEOPOROSIS, UNSPECIFIED OSTEOPOROSIS TYPE, UNSPECIFIED PATHOLOGICAL FRACTURE PRESENCE: ICD-10-CM

## 2019-09-05 PROCEDURE — 77080 DXA BONE DENSITY AXIAL: CPT | Performed by: INTERNAL MEDICINE

## 2019-09-06 ENCOUNTER — TELEPHONE (OUTPATIENT)
Dept: HEMATOLOGY/ONCOLOGY | Facility: HOSPITAL | Age: 64
End: 2019-09-06

## 2019-09-06 NOTE — TELEPHONE ENCOUNTER
Julio Cesar Mitchell MD  [facility] [address] [facility]             Call, bone density stable compared to last 2 times     Unable to reach pt by phone. HIPAA consent not signed. Left VM requesting pt to call back clinic RN line.

## 2019-09-11 ENCOUNTER — HOSPITAL ENCOUNTER (OUTPATIENT)
Dept: MAMMOGRAPHY | Facility: HOSPITAL | Age: 64
Discharge: HOME | End: 2019-09-11
Attending: INTERNAL MEDICINE
Payer: COMMERCIAL

## 2019-09-11 ENCOUNTER — TELEPHONE (OUTPATIENT)
Dept: HEMATOLOGY/ONCOLOGY | Facility: HOSPITAL | Age: 64
End: 2019-09-11

## 2019-09-11 DIAGNOSIS — Z17.0 MALIGNANT NEOPLASM OF UPPER-INNER QUADRANT OF LEFT BREAST IN FEMALE, ESTROGEN RECEPTOR POSITIVE (HCC): ICD-10-CM

## 2019-09-11 DIAGNOSIS — C50.212 MALIGNANT NEOPLASM OF UPPER-INNER QUADRANT OF LEFT BREAST IN FEMALE, ESTROGEN RECEPTOR POSITIVE (HCC): ICD-10-CM

## 2019-09-11 PROCEDURE — 77063 BREAST TOMOSYNTHESIS BI: CPT | Performed by: INTERNAL MEDICINE

## 2019-09-11 PROCEDURE — 77067 SCR MAMMO BI INCL CAD: CPT | Performed by: INTERNAL MEDICINE

## 2019-09-11 NOTE — TELEPHONE ENCOUNTER
Arcelia Vyas MD  [facility]             Call mammo ok, repeat 1 yr      Left VM with results and requested pt to call back with any questions.

## 2019-09-13 RX ORDER — SERTRALINE HYDROCHLORIDE 25 MG/1
TABLET, FILM COATED ORAL
Qty: 90 TABLET | Refills: 0 | Status: SHIPPED | OUTPATIENT
Start: 2019-09-13 | End: 2020-02-18

## 2019-11-08 RX ORDER — ALENDRONATE SODIUM 70 MG/1
TABLET ORAL
Qty: 12 TABLET | Refills: 0 | Status: SHIPPED | OUTPATIENT
Start: 2019-11-08 | End: 2020-02-18

## 2019-12-04 ENCOUNTER — APPOINTMENT (OUTPATIENT)
Dept: HEMATOLOGY/ONCOLOGY | Facility: HOSPITAL | Age: 64
End: 2019-12-04
Attending: INTERNAL MEDICINE
Payer: COMMERCIAL

## 2019-12-11 ENCOUNTER — APPOINTMENT (OUTPATIENT)
Dept: HEMATOLOGY/ONCOLOGY | Facility: HOSPITAL | Age: 64
End: 2019-12-11
Attending: INTERNAL MEDICINE
Payer: COMMERCIAL

## 2019-12-26 RX ORDER — SERTRALINE HYDROCHLORIDE 25 MG/1
TABLET, FILM COATED ORAL
Qty: 90 TABLET | Refills: 0 | OUTPATIENT
Start: 2019-12-26

## 2020-01-08 ENCOUNTER — OFFICE VISIT (OUTPATIENT)
Dept: HEMATOLOGY/ONCOLOGY | Facility: HOSPITAL | Age: 65
End: 2020-01-08
Attending: INTERNAL MEDICINE
Payer: COMMERCIAL

## 2020-01-08 VITALS
HEART RATE: 83 BPM | OXYGEN SATURATION: 98 % | RESPIRATION RATE: 16 BRPM | DIASTOLIC BLOOD PRESSURE: 54 MMHG | HEIGHT: 62.01 IN | SYSTOLIC BLOOD PRESSURE: 103 MMHG | WEIGHT: 107 LBS | BODY MASS INDEX: 19.44 KG/M2

## 2020-01-08 DIAGNOSIS — Z17.0 MALIGNANT NEOPLASM OF UPPER-INNER QUADRANT OF LEFT BREAST IN FEMALE, ESTROGEN RECEPTOR POSITIVE (HCC): Primary | ICD-10-CM

## 2020-01-08 DIAGNOSIS — M81.0 OSTEOPOROSIS, UNSPECIFIED OSTEOPOROSIS TYPE, UNSPECIFIED PATHOLOGICAL FRACTURE PRESENCE: ICD-10-CM

## 2020-01-08 DIAGNOSIS — C50.212 MALIGNANT NEOPLASM OF UPPER-INNER QUADRANT OF LEFT BREAST IN FEMALE, ESTROGEN RECEPTOR POSITIVE (HCC): Primary | ICD-10-CM

## 2020-01-08 PROCEDURE — 99213 OFFICE O/P EST LOW 20 MIN: CPT | Performed by: INTERNAL MEDICINE

## 2020-01-08 NOTE — PROGRESS NOTES
[facility] Progress Note      Patient Name:  Clemente Kc  YOB: 1955  Medical Record Number:  [id number]    Encounter Date: 1/8/2020    CHIEF COMPLAINT: Stage I L breast ca s/p lumpectomy     HPI:     59year old female that I not in acute distress. HEENT: JAMES, oropharynx  clear. Neck: supple. No JVD /adenopathy. CVS: S1S2, regular  Rhythm, no murmurs. Lungs: Clear to auscultation and percussion. Abdomen: Soft, non tender, no hepato-splenomegaly.   Extremities:  No edema

## 2020-02-18 ENCOUNTER — OFFICE VISIT (OUTPATIENT)
Dept: FAMILY MEDICINE CLINIC | Facility: CLINIC | Age: 65
End: 2020-02-18
Payer: COMMERCIAL

## 2020-02-18 VITALS
HEIGHT: 62 IN | DIASTOLIC BLOOD PRESSURE: 60 MMHG | BODY MASS INDEX: 19.69 KG/M2 | RESPIRATION RATE: 16 BRPM | SYSTOLIC BLOOD PRESSURE: 92 MMHG | WEIGHT: 107 LBS | HEART RATE: 84 BPM | TEMPERATURE: 98 F | OXYGEN SATURATION: 100 %

## 2020-02-18 DIAGNOSIS — Z12.4 SCREENING FOR MALIGNANT NEOPLASM OF CERVIX: ICD-10-CM

## 2020-02-18 DIAGNOSIS — F43.9 STRESS: ICD-10-CM

## 2020-02-18 DIAGNOSIS — M81.0 AGE-RELATED OSTEOPOROSIS WITHOUT CURRENT PATHOLOGICAL FRACTURE: ICD-10-CM

## 2020-02-18 DIAGNOSIS — Z00.00 LABORATORY EXAMINATION ORDERED AS PART OF A ROUTINE GENERAL MEDICAL EXAMINATION: ICD-10-CM

## 2020-02-18 DIAGNOSIS — Z00.00 PHYSICAL EXAM, ANNUAL: Primary | ICD-10-CM

## 2020-02-18 DIAGNOSIS — Z13.89 SCREENING FOR GENITOURINARY CONDITION: ICD-10-CM

## 2020-02-18 DIAGNOSIS — Z12.11 SCREEN FOR COLON CANCER: ICD-10-CM

## 2020-02-18 PROCEDURE — 99213 OFFICE O/P EST LOW 20 MIN: CPT | Performed by: FAMILY MEDICINE

## 2020-02-18 PROCEDURE — 88175 CYTOPATH C/V AUTO FLUID REDO: CPT | Performed by: FAMILY MEDICINE

## 2020-02-18 PROCEDURE — 87624 HPV HI-RISK TYP POOLED RSLT: CPT | Performed by: FAMILY MEDICINE

## 2020-02-18 PROCEDURE — 99396 PREV VISIT EST AGE 40-64: CPT | Performed by: FAMILY MEDICINE

## 2020-02-18 RX ORDER — ALENDRONATE SODIUM 70 MG/1
TABLET ORAL
Qty: 12 TABLET | Refills: 1 | Status: SHIPPED | OUTPATIENT
Start: 2020-02-18 | End: 2020-11-13

## 2020-02-18 RX ORDER — SERTRALINE HYDROCHLORIDE 25 MG/1
TABLET, FILM COATED ORAL
Qty: 90 TABLET | Refills: 1 | Status: SHIPPED | OUTPATIENT
Start: 2020-02-18 | End: 2020-08-21

## 2020-02-18 NOTE — PATIENT INSTRUCTIONS
Shingrix- shingles shot. Healthy diet. Stay active. Call GI to schedule colonoscopy. Continue current medications at current doses.

## 2020-02-18 NOTE — PROGRESS NOTES
HPI:   Joey Loseervin is a 59year old female who presents for a complete physical exam. Symptoms: denies discharge, itching, burning or dysuria. Patient   Would like also refill her medication.   She is having osteoporosis her DEXA scan was done at 12/14/2012 109     AST (U/L)   Date Value   12/04/2018 37   12/01/2017 23   11/18/2016 22   12/17/2013 23   08/27/2013 32   04/23/2013 34   10/17/2011 30   07/08/2011 26     ALT (U/L)   Date Value   12/04/2018 17   12/01/2017 20   11/18/2016 17   12/17/2 one.   Exercise: three times per week.   Diet: watches calories closely     REVIEW OF SYSTEMS:   GENERAL: feels well otherwise  SKIN: denies any unusual skin lesions  EYES:denies blurred vision or double vision  HEENT: denies nasal congestion, sinus pain or examination  Screening for genitourinary condition  Screening for malignant neoplasm of cervix  Screen for colon cancer  Age-related osteoporosis without current pathological fracture  Stress    Orders Placed This Encounter      CBC W/DIFF      COMP METABO

## 2020-02-19 LAB — HPV I/H RISK 1 DNA SPEC QL NAA+PROBE: NEGATIVE

## 2020-04-14 ENCOUNTER — TELEPHONE (OUTPATIENT)
Dept: HEMATOLOGY/ONCOLOGY | Facility: HOSPITAL | Age: 65
End: 2020-04-14

## 2020-04-14 NOTE — TELEPHONE ENCOUNTER
LVM for patient to offer a telephone visit or to reschedule. Reviewed that we are taking different scheduling precautions due to the corona virus. Asked her to call back to arrange.

## 2020-05-06 ENCOUNTER — VIRTUAL PHONE E/M (OUTPATIENT)
Dept: HEMATOLOGY/ONCOLOGY | Facility: HOSPITAL | Age: 65
End: 2020-05-06
Attending: INTERNAL MEDICINE
Payer: COMMERCIAL

## 2020-05-06 DIAGNOSIS — C50.212 MALIGNANT NEOPLASM OF UPPER-INNER QUADRANT OF LEFT BREAST IN FEMALE, ESTROGEN RECEPTOR POSITIVE (HCC): ICD-10-CM

## 2020-05-06 DIAGNOSIS — Z17.0 MALIGNANT NEOPLASM OF UPPER-INNER QUADRANT OF LEFT BREAST IN FEMALE, ESTROGEN RECEPTOR POSITIVE (HCC): ICD-10-CM

## 2020-05-06 PROCEDURE — 99213 OFFICE O/P EST LOW 20 MIN: CPT | Performed by: INTERNAL MEDICINE

## 2020-05-06 NOTE — PROGRESS NOTES
Virtual Telephone Check-In    [facility] verbally consents to a Virtual/Telephone Check-In visit on 05/06/20. Patient understands and accepts financial responsibility for any deductible, co-insurance and/or co-pays associated with this service. Take 1 tablet by mouth 2 (two) times daily.      • Multiple Vitamins-Minerals (CENTRUM SILVER ULTRA WOMENS) Oral Tab None Entered         VITALS:      PS:  ECOG:     PHYSICAL EXAM:    Deferred due to telephone visit but no distress noted in voice during con

## 2020-06-24 ENCOUNTER — TELEPHONE (OUTPATIENT)
Dept: HEMATOLOGY/ONCOLOGY | Facility: HOSPITAL | Age: 65
End: 2020-06-24

## 2020-06-24 NOTE — TELEPHONE ENCOUNTER
Returned her call. Pt requesting a call from Dr Noemi Jacome to discuss something regarding her . I let her know I would pass along the message. [phone] preferred number.

## 2020-06-24 NOTE — TELEPHONE ENCOUNTER
Stephani called and was looking to speak with the Dr about her . She asked that she get a call back to go over this.

## 2020-08-04 ENCOUNTER — TELEPHONE (OUTPATIENT)
Dept: HEMATOLOGY/ONCOLOGY | Facility: HOSPITAL | Age: 65
End: 2020-08-04

## 2020-08-04 DIAGNOSIS — C50.212 MALIGNANT NEOPLASM OF UPPER-INNER QUADRANT OF LEFT BREAST IN FEMALE, ESTROGEN RECEPTOR POSITIVE (HCC): Primary | ICD-10-CM

## 2020-08-04 DIAGNOSIS — Z17.0 MALIGNANT NEOPLASM OF UPPER-INNER QUADRANT OF LEFT BREAST IN FEMALE, ESTROGEN RECEPTOR POSITIVE (HCC): Primary | ICD-10-CM

## 2020-08-05 ENCOUNTER — HOSPITAL ENCOUNTER (OUTPATIENT)
Dept: MAMMOGRAPHY | Facility: HOSPITAL | Age: 65
Discharge: HOME | End: 2020-08-05
Attending: INTERNAL MEDICINE
Payer: MEDICARE

## 2020-08-05 DIAGNOSIS — Z17.0 MALIGNANT NEOPLASM OF UPPER-INNER QUADRANT OF LEFT BREAST IN FEMALE, ESTROGEN RECEPTOR POSITIVE (HCC): ICD-10-CM

## 2020-08-05 DIAGNOSIS — C50.212 MALIGNANT NEOPLASM OF UPPER-INNER QUADRANT OF LEFT BREAST IN FEMALE, ESTROGEN RECEPTOR POSITIVE (HCC): ICD-10-CM

## 2020-08-05 PROCEDURE — 77063 BREAST TOMOSYNTHESIS BI: CPT | Performed by: INTERNAL MEDICINE

## 2020-08-05 PROCEDURE — 77067 SCR MAMMO BI INCL CAD: CPT | Performed by: INTERNAL MEDICINE

## 2020-08-06 ENCOUNTER — TELEPHONE (OUTPATIENT)
Dept: HEMATOLOGY/ONCOLOGY | Facility: HOSPITAL | Age: 65
End: 2020-08-06

## 2020-08-06 NOTE — TELEPHONE ENCOUNTER
Candi Scott MD  [facility]             Call, mammo ok, repeat 1 yr      Reviewed the above with the patient. She has a follow up scheduled in Sept.   Pt asking about when her dexa scan is due.    I let her know I would follow up with Dr Alyce Smith

## 2020-08-06 NOTE — TELEPHONE ENCOUNTER
Let pt know Dr Pinedo Grandelisabet placed the order for dexa scan, and she can arrange by calling central scheduling. Advised it may need to be scheduled after 9/5, since her one last year was 9/5. Pt verbalized understanding.

## 2020-08-21 RX ORDER — SERTRALINE HYDROCHLORIDE 25 MG/1
TABLET, FILM COATED ORAL
Qty: 90 TABLET | Refills: 0 | Status: SHIPPED | OUTPATIENT
Start: 2020-08-21 | End: 2020-11-13

## 2020-08-21 NOTE — TELEPHONE ENCOUNTER
SERTRALINE 25MG TABLETS    None protocol medication. Please see pended medications. Please sign if appropriate.       Thank you    Pt has an OV on 09/24/2020    Last refill: 02/18/2020

## 2020-09-21 NOTE — PROGRESS NOTES
[facility] Progress Note      Patient Name:  Nneka Sr  YOB: 1955  Medical Record Number:  [id number]    Encounter Date: 9/22/2020    CHIEF COMPLAINT: Stage I L breast ca s/p lumpectomy     HPI:     72year old female lavonne VITALS:      PS:  ECO    PHYSICAL EXAM:    General: alert and oriented x 3, not in acute distress. HEENT: JAMES, oropharynx  clear. Neck: supple. No JVD /adenopathy. CVS: S1S2, regular  Rhythm, no murmurs.    Lungs: Clear to auscultation and p

## 2020-09-22 ENCOUNTER — OFFICE VISIT (OUTPATIENT)
Dept: HEMATOLOGY/ONCOLOGY | Facility: HOSPITAL | Age: 65
End: 2020-09-22
Attending: INTERNAL MEDICINE
Payer: MEDICARE

## 2020-09-22 VITALS
RESPIRATION RATE: 16 BRPM | DIASTOLIC BLOOD PRESSURE: 55 MMHG | HEART RATE: 80 BPM | OXYGEN SATURATION: 100 % | TEMPERATURE: 98 F | BODY MASS INDEX: 18 KG/M2 | WEIGHT: 100 LBS | SYSTOLIC BLOOD PRESSURE: 101 MMHG

## 2020-09-22 DIAGNOSIS — Z17.0 MALIGNANT NEOPLASM OF UPPER-INNER QUADRANT OF LEFT BREAST IN FEMALE, ESTROGEN RECEPTOR POSITIVE (HCC): Primary | ICD-10-CM

## 2020-09-22 DIAGNOSIS — C50.212 MALIGNANT NEOPLASM OF UPPER-INNER QUADRANT OF LEFT BREAST IN FEMALE, ESTROGEN RECEPTOR POSITIVE (HCC): Primary | ICD-10-CM

## 2020-09-22 PROCEDURE — 99213 OFFICE O/P EST LOW 20 MIN: CPT | Performed by: INTERNAL MEDICINE

## 2020-09-22 NOTE — PROGRESS NOTES
Routine MD visit, hx of breast cancer. She competed endocrine therapy 5/2017. Imaging up to date.      Education Record    Learner:  Patient    Disease / Diagnosis:    Barriers / Limitations:  None   Comments:    Method:  Discussion   Comments:    Gener

## 2020-09-24 ENCOUNTER — OFFICE VISIT (OUTPATIENT)
Dept: FAMILY MEDICINE CLINIC | Facility: CLINIC | Age: 65
End: 2020-09-24
Payer: MEDICARE

## 2020-09-24 VITALS
OXYGEN SATURATION: 99 % | HEIGHT: 62 IN | TEMPERATURE: 98 F | BODY MASS INDEX: 18.45 KG/M2 | HEART RATE: 70 BPM | WEIGHT: 100.25 LBS | DIASTOLIC BLOOD PRESSURE: 52 MMHG | SYSTOLIC BLOOD PRESSURE: 94 MMHG | RESPIRATION RATE: 16 BRPM

## 2020-09-24 DIAGNOSIS — M81.0 AGE-RELATED OSTEOPOROSIS WITHOUT CURRENT PATHOLOGICAL FRACTURE: ICD-10-CM

## 2020-09-24 DIAGNOSIS — Z23 NEED FOR VACCINATION: ICD-10-CM

## 2020-09-24 DIAGNOSIS — Z00.00 ENCOUNTER FOR ANNUAL HEALTH EXAMINATION: Primary | ICD-10-CM

## 2020-09-24 DIAGNOSIS — E55.9 VITAMIN D DEFICIENCY: ICD-10-CM

## 2020-09-24 DIAGNOSIS — F43.9 STRESS: ICD-10-CM

## 2020-09-24 DIAGNOSIS — Z12.11 SCREEN FOR COLON CANCER: ICD-10-CM

## 2020-09-24 DIAGNOSIS — Z78.9 VEGETARIAN DIET: ICD-10-CM

## 2020-09-24 DIAGNOSIS — E78.00 HYPERCHOLESTEREMIA: ICD-10-CM

## 2020-09-24 PROCEDURE — 3074F SYST BP LT 130 MM HG: CPT | Performed by: FAMILY MEDICINE

## 2020-09-24 PROCEDURE — 3008F BODY MASS INDEX DOCD: CPT | Performed by: FAMILY MEDICINE

## 2020-09-24 PROCEDURE — 90662 IIV NO PRSV INCREASED AG IM: CPT | Performed by: FAMILY MEDICINE

## 2020-09-24 PROCEDURE — 99397 PER PM REEVAL EST PAT 65+ YR: CPT | Performed by: FAMILY MEDICINE

## 2020-09-24 PROCEDURE — 96160 PT-FOCUSED HLTH RISK ASSMT: CPT | Performed by: FAMILY MEDICINE

## 2020-09-24 PROCEDURE — G0402 INITIAL PREVENTIVE EXAM: HCPCS | Performed by: FAMILY MEDICINE

## 2020-09-24 PROCEDURE — G0008 ADMIN INFLUENZA VIRUS VAC: HCPCS | Performed by: FAMILY MEDICINE

## 2020-09-24 PROCEDURE — 3078F DIAST BP <80 MM HG: CPT | Performed by: FAMILY MEDICINE

## 2020-09-24 NOTE — PROGRESS NOTES
HPI:   Mathew Mirza is a 72year old female who presents for a MA (Medicare Advantage) Supervisit (Once per calendar year). Patient will return for her pneumonia vaccination in 2 weeks. She is seeing Dr. Raymond Ferreira for breast cancer doing well.     Bishop Lai 1.980 12/04/2018    CREATSERUM 0.82 12/04/2018    GLU 83 12/04/2018        CBC  (most recent labs)   Lab Results   Component Value Date    WBC 7.8 12/04/2018    HGB 12.4 12/04/2018    .0 12/04/2018        ALLERGIES:   She has No Known Allergies. incontinence   MUSCULOSKELETAL: denies back pain  NEURO: denies headaches  PSYCHE: denies depression or anxiety  HEMATOLOGIC: denies hx of anemia  ENDOCRINE: denies thyroid history  ALL/ASTHMA: denies hx of allergy or asthma    EXAM:   BP 94/52 (BP Locatio atraumatic, no cyanosis or edema   Pulses: 2+ and symmetric   Skin: Skin color, texture, turgor normal, no rashes or lesions   Lymph nodes: Cervical, supraclavicular, and axillary nodes normal   Neurologic: Normal       Vaccination History     Immunization Stay active. Return for pneumonia vaccination Prevnar 13 in 2 weeks. Do test for occult blood from the stool. Call GI Dr. Jefferson Thompson for colonoscopy. Call [phone] to schedule fasting blood work.   The patient indicates understanding of these is Glaucoma Screening      Ophthalmology Visit Annually: Diabetics, FHx Glaucoma, AA>50, > 65 No flowsheet data found.     Bone Density Screening      Dexascan Every two years Last Dexa Scan:   XR DEXA BONE DENSITOMETRY (CPT=77080) 09/05/2019    No f

## 2020-09-24 NOTE — PATIENT INSTRUCTIONS
Continue current meds. Watch diet for fats and carbs. Stay active. Return for pneumonia vaccination Prevnar 13 in 2 weeks. Do test for occult blood from the stool. Call GI Dr. Cristhian Heller for colonoscopy.   Call [phone] to schedule fasting bloo 12/04/2018 219 (H)        EKG - covered if needed at Welcome to Medicare, and non-screening if indicated for medical reasons Electrocardiogram date Routine EKG is not a screening covered service except at the Welcome to Medicare Visit    Abdominal aortic if High Risk   Pap Smear,3 Years due on 02/18/2023     Chlamydia  Annually if high risk No results found for: CHLAMYDIA No flowsheet data found.     Screening Mammogram      Mammogram    Recommend Annually to at least age 76, and as needed after 76 Mammogra also has the State forms available on it's website for anyone to review and print using their home computer and printer. (the forms are also available in [address])  www. [email or web address] org  This link also has information from the [place] [address]

## 2020-09-29 ENCOUNTER — NURSE ONLY (OUTPATIENT)
Dept: FAMILY MEDICINE CLINIC | Facility: CLINIC | Age: 65
End: 2020-09-29
Payer: MEDICARE

## 2020-09-29 ENCOUNTER — LAB ENCOUNTER (OUTPATIENT)
Dept: LAB | Age: 65
End: 2020-09-29
Attending: FAMILY MEDICINE
Payer: MEDICARE

## 2020-09-29 DIAGNOSIS — Z78.9 VEGETARIAN DIET: ICD-10-CM

## 2020-09-29 DIAGNOSIS — Z13.89 SCREENING FOR GENITOURINARY CONDITION: ICD-10-CM

## 2020-09-29 DIAGNOSIS — E55.9 VITAMIN D DEFICIENCY: ICD-10-CM

## 2020-09-29 DIAGNOSIS — E78.00 HYPERCHOLESTEREMIA: ICD-10-CM

## 2020-09-29 DIAGNOSIS — D64.9 ANEMIA, UNSPECIFIED TYPE: ICD-10-CM

## 2020-09-29 DIAGNOSIS — D64.9 ANEMIA, UNSPECIFIED TYPE: Primary | ICD-10-CM

## 2020-09-29 DIAGNOSIS — Z00.00 LABORATORY EXAMINATION ORDERED AS PART OF A ROUTINE GENERAL MEDICAL EXAMINATION: ICD-10-CM

## 2020-09-29 PROCEDURE — 90670 PCV13 VACCINE IM: CPT | Performed by: FAMILY MEDICINE

## 2020-09-29 PROCEDURE — 36415 COLL VENOUS BLD VENIPUNCTURE: CPT

## 2020-09-29 PROCEDURE — 82728 ASSAY OF FERRITIN: CPT

## 2020-09-29 PROCEDURE — 83550 IRON BINDING TEST: CPT

## 2020-09-29 PROCEDURE — 80053 COMPREHEN METABOLIC PANEL: CPT

## 2020-09-29 PROCEDURE — G0009 ADMIN PNEUMOCOCCAL VACCINE: HCPCS | Performed by: FAMILY MEDICINE

## 2020-09-29 PROCEDURE — 82306 VITAMIN D 25 HYDROXY: CPT

## 2020-09-29 PROCEDURE — 85025 COMPLETE CBC W/AUTO DIFF WBC: CPT

## 2020-09-29 PROCEDURE — 80061 LIPID PANEL: CPT

## 2020-09-29 PROCEDURE — 81001 URINALYSIS AUTO W/SCOPE: CPT

## 2020-09-29 PROCEDURE — 82607 VITAMIN B-12: CPT

## 2020-09-29 PROCEDURE — 84443 ASSAY THYROID STIM HORMONE: CPT

## 2020-09-29 PROCEDURE — 83540 ASSAY OF IRON: CPT

## 2020-10-01 ENCOUNTER — LAB ENCOUNTER (OUTPATIENT)
Dept: LAB | Age: 65
End: 2020-10-01
Attending: FAMILY MEDICINE
Payer: MEDICARE

## 2020-10-01 DIAGNOSIS — Z12.11 SCREEN FOR COLON CANCER: ICD-10-CM

## 2020-10-01 PROCEDURE — 82274 ASSAY TEST FOR BLOOD FECAL: CPT

## 2020-10-02 ENCOUNTER — HOSPITAL ENCOUNTER (OUTPATIENT)
Dept: BONE DENSITY | Age: 65
Discharge: HOME | End: 2020-10-02
Attending: INTERNAL MEDICINE
Payer: MEDICARE

## 2020-10-02 DIAGNOSIS — M81.0 AGE-RELATED OSTEOPOROSIS WITHOUT CURRENT PATHOLOGICAL FRACTURE: ICD-10-CM

## 2020-10-02 PROCEDURE — 77080 DXA BONE DENSITY AXIAL: CPT | Performed by: INTERNAL MEDICINE

## 2020-11-13 RX ORDER — SERTRALINE HYDROCHLORIDE 25 MG/1
TABLET, FILM COATED ORAL
Qty: 90 TABLET | Refills: 0 | Status: SHIPPED | OUTPATIENT
Start: 2020-11-13 | End: 2021-02-22

## 2020-11-13 RX ORDER — ALENDRONATE SODIUM 70 MG/1
TABLET ORAL
Qty: 12 TABLET | Refills: 1 | Status: SHIPPED | OUTPATIENT
Start: 2020-11-13 | End: 2021-04-19

## 2020-11-13 NOTE — TELEPHONE ENCOUNTER
SERTRALINE 25MG TABLETS    Non protocol medication. Please see pended medications. Please sign if appropriate.       Thank you      Last OV: 09/22/2020

## 2021-01-15 ENCOUNTER — LAB ENCOUNTER (OUTPATIENT)
Dept: LAB | Facility: HOSPITAL | Age: 66
End: 2021-01-15
Attending: INTERNAL MEDICINE
Payer: MEDICARE

## 2021-01-15 DIAGNOSIS — Z01.818 PREOP EXAMINATION: ICD-10-CM

## 2021-01-16 LAB — SARS-COV-2 RNA RESP QL NAA+PROBE: NOT DETECTED

## 2021-01-18 PROBLEM — Z12.11 SPECIAL SCREENING FOR MALIGNANT NEOPLASM OF COLON: Status: ACTIVE | Noted: 2021-01-18

## 2021-01-19 ENCOUNTER — TELEPHONE (OUTPATIENT)
Dept: HEMATOLOGY/ONCOLOGY | Facility: HOSPITAL | Age: 66
End: 2021-01-19

## 2021-01-20 ENCOUNTER — TELEPHONE (OUTPATIENT)
Dept: FAMILY MEDICINE CLINIC | Facility: CLINIC | Age: 66
End: 2021-01-20

## 2021-01-20 DIAGNOSIS — Z17.0 ESTROGEN RECEPTOR POSITIVE STATUS (ER+): ICD-10-CM

## 2021-01-20 DIAGNOSIS — C50.212 MALIGNANT NEOPLASM OF UPPER-INNER QUADRANT OF LEFT FEMALE BREAST, UNSPECIFIED ESTROGEN RECEPTOR STATUS (HCC): Primary | ICD-10-CM

## 2021-01-20 NOTE — TELEPHONE ENCOUNTER
Referral to Stevie Castelan, Hematology/Oncology (INTERNAL)    Reason for the order/referral:  HEMATOLOGY/ONCOLOGY/FUP   PCP:  [address]   Refer to Provider:  Naz Dalton  Specialty:  HEMATOLOGY/ONCOLOGY   Patient Insurance: Payor: [facility] / Plan:

## 2021-01-22 ENCOUNTER — APPOINTMENT (OUTPATIENT)
Dept: HEMATOLOGY/ONCOLOGY | Facility: HOSPITAL | Age: 66
End: 2021-01-22
Attending: INTERNAL MEDICINE
Payer: MEDICARE

## 2021-01-26 NOTE — PROGRESS NOTES
[facility] Progress Note      Patient Name:  Mathew Mirza  YOB: 1955  Medical Record Number:  [id number]    Encounter Date: 1/27/2021    CHIEF COMPLAINT: Stage I L breast ca s/p lumpectomy     HPI:     72year old female lavonne clear.  Neck: supple. No JVD /adenopathy. CVS: S1S2, regular  Rhythm, no murmurs. Lungs: Clear to auscultation and percussion. Abdomen: Soft, non tender, no hepato-splenomegaly. Extremities:  No edema.   CNS: no focal deficit  Breasts: Both are soft,

## 2021-01-27 ENCOUNTER — OFFICE VISIT (OUTPATIENT)
Dept: HEMATOLOGY/ONCOLOGY | Facility: HOSPITAL | Age: 66
End: 2021-01-27
Attending: INTERNAL MEDICINE
Payer: MEDICARE

## 2021-01-27 VITALS
WEIGHT: 104.38 LBS | HEIGHT: 62.01 IN | HEART RATE: 80 BPM | DIASTOLIC BLOOD PRESSURE: 66 MMHG | TEMPERATURE: 98 F | SYSTOLIC BLOOD PRESSURE: 101 MMHG | RESPIRATION RATE: 16 BRPM | OXYGEN SATURATION: 96 % | BODY MASS INDEX: 18.97 KG/M2

## 2021-01-27 DIAGNOSIS — Z01.419 WELL WOMAN EXAM: Primary | ICD-10-CM

## 2021-01-27 PROCEDURE — 99213 OFFICE O/P EST LOW 20 MIN: CPT | Performed by: INTERNAL MEDICINE

## 2021-01-27 NOTE — PROGRESS NOTES
MD follow up for left  breast cancer. Pt feeling well. No new medical problems. Imaging up to date.    Education Record    Learner:  Patient    Disease / Lucendia Severance of care    Barriers / Limitations:  None   Comments:    Method:  Discussion   Comm

## 2021-02-22 RX ORDER — SERTRALINE HYDROCHLORIDE 25 MG/1
TABLET, FILM COATED ORAL
Qty: 30 TABLET | Refills: 0 | Status: SHIPPED | OUTPATIENT
Start: 2021-02-22 | End: 2021-03-31

## 2021-02-22 NOTE — TELEPHONE ENCOUNTER
SERTRALINE 25MG TABLETS    None protocol medication. Please see pended medications. Please sign if appropriate. Thank you    Last OV: 09/24/2020    A [employer] message was sent to the pt to call and schedule her appt.

## 2021-03-12 DIAGNOSIS — Z23 NEED FOR VACCINATION: ICD-10-CM

## 2021-03-31 RX ORDER — SERTRALINE HYDROCHLORIDE 25 MG/1
TABLET, FILM COATED ORAL
Qty: 30 TABLET | Refills: 0 | Status: SHIPPED | OUTPATIENT
Start: 2021-03-31 | End: 2021-04-19

## 2021-04-19 ENCOUNTER — OFFICE VISIT (OUTPATIENT)
Dept: FAMILY MEDICINE CLINIC | Facility: CLINIC | Age: 66
End: 2021-04-19
Payer: MEDICARE

## 2021-04-19 VITALS
RESPIRATION RATE: 16 BRPM | WEIGHT: 105 LBS | OXYGEN SATURATION: 98 % | SYSTOLIC BLOOD PRESSURE: 102 MMHG | DIASTOLIC BLOOD PRESSURE: 58 MMHG | TEMPERATURE: 98 F | HEART RATE: 76 BPM | BODY MASS INDEX: 19.32 KG/M2 | HEIGHT: 62 IN

## 2021-04-19 DIAGNOSIS — E55.9 VITAMIN D DEFICIENCY: ICD-10-CM

## 2021-04-19 DIAGNOSIS — C50.212 MALIGNANT NEOPLASM OF UPPER-INNER QUADRANT OF LEFT BREAST IN FEMALE, ESTROGEN RECEPTOR POSITIVE (HCC): ICD-10-CM

## 2021-04-19 DIAGNOSIS — F43.9 STRESS: ICD-10-CM

## 2021-04-19 DIAGNOSIS — M81.0 AGE-RELATED OSTEOPOROSIS WITHOUT CURRENT PATHOLOGICAL FRACTURE: ICD-10-CM

## 2021-04-19 DIAGNOSIS — Z00.00 ENCOUNTER FOR ANNUAL HEALTH EXAMINATION: Primary | ICD-10-CM

## 2021-04-19 DIAGNOSIS — Z17.0 MALIGNANT NEOPLASM OF UPPER-INNER QUADRANT OF LEFT BREAST IN FEMALE, ESTROGEN RECEPTOR POSITIVE (HCC): ICD-10-CM

## 2021-04-19 DIAGNOSIS — E78.00 HYPERCHOLESTEREMIA: ICD-10-CM

## 2021-04-19 DIAGNOSIS — Z78.9 VEGETARIAN DIET: ICD-10-CM

## 2021-04-19 PROCEDURE — 96160 PT-FOCUSED HLTH RISK ASSMT: CPT | Performed by: FAMILY MEDICINE

## 2021-04-19 PROCEDURE — G0438 PPPS, INITIAL VISIT: HCPCS | Performed by: FAMILY MEDICINE

## 2021-04-19 PROCEDURE — 3008F BODY MASS INDEX DOCD: CPT | Performed by: FAMILY MEDICINE

## 2021-04-19 PROCEDURE — 3074F SYST BP LT 130 MM HG: CPT | Performed by: FAMILY MEDICINE

## 2021-04-19 PROCEDURE — 99397 PER PM REEVAL EST PAT 65+ YR: CPT | Performed by: FAMILY MEDICINE

## 2021-04-19 PROCEDURE — 3078F DIAST BP <80 MM HG: CPT | Performed by: FAMILY MEDICINE

## 2021-04-19 RX ORDER — ALENDRONATE SODIUM 70 MG/1
70 TABLET ORAL WEEKLY
Qty: 12 TABLET | Refills: 1 | Status: SHIPPED | OUTPATIENT
Start: 2021-04-19 | End: 2021-10-28

## 2021-04-19 RX ORDER — SERTRALINE HYDROCHLORIDE 25 MG/1
25 TABLET, FILM COATED ORAL DAILY
Qty: 90 TABLET | Refills: 1 | Status: SHIPPED | OUTPATIENT
Start: 2021-04-19 | End: 2021-12-07

## 2021-04-19 NOTE — PROGRESS NOTES
HPI:   Joan Quispe is a 72year old female who presents for a MA (Medicare Advantage) Supervisit (Once per calendar year). Patient will return for her pneumonia vaccination in May. She is seeing Dr. Bin Ko for breast cancer doing well.     Annual Labs:   Lab Results   Component Value Date    AST 33 09/29/2020    ALT 23 09/29/2020    CA 8.6 09/29/2020    ALB 3.7 09/29/2020    TSH 1.620 09/29/2020    CREATSERUM 0.78 09/29/2020    GLU 70 09/29/2020        CBC  (most recent labs)   Lab Results   Compon denies chest pain on exertion  GI: denies abdominal pain, denies heartburn  : denies dysuria, vaginal discharge or itching, no complaint of urinary incontinence   MUSCULOSKELETAL: denies back pain  NEURO: denies headaches  PSYCHE: denies depression or an bowel sounds active all four quadrants,  no masses, no organomegaly   Pelvic: Deferred  Breast examination by oncologist   Extremities: Extremities normal, atraumatic, no cyanosis or edema   Pulses: 2+ and symmetric   Skin: Skin color, texture, turgor norm level    Osteoporosis management by oncologist she is due for bone density scan    Stress on sertraline continue current medications    Vegetarian diet check counts and vitamin B-12 level    Depression screening reviewed.     Diet assessment: good     PLAN: every 10 years No results found for this or any previous visit. No flowsheet data found. Fecal Occult Blood Annually Occult Blood (no units)   Date Value   10/01/2020 Negative    No flowsheet data found.     Glaucoma Screening      Ophthalmology Visit A prescription benefits                        Template: [employer] MEDICARE ANNUAL ASSESSMENT FEMALE [25962]

## 2021-04-19 NOTE — PATIENT INSTRUCTIONS
Call [phone] to schedule fasting blood work. Healthy diet. Stay active. Stephani Suggs's SCREENING SCHEDULE   Tests on this list are recommended by your physician but may not be covered, or covered at this frequency, by your insurer.  Please chec except at the [place] to Medicare Visit    Abdominal aortic aneurysm screening (once between ages 73-68) IPPE only No results found for this or any previous visit.  Limited to patients who meet one of the following criteria:   • Men who are 73-68 years old Mammogram    Recommend Annually to at least age 76, and as needed after 76 Mammogram due on 08/05/2021 Please get this Mammogram regularly   Immunizations      Influenza  Covered Annually Orders placed or performed in visit on 09/24/20   • FLU VACC HIGH DO also available in [address])  [email or web address]  This link also has information from the [address] [place] [address] regarding Advance Directives.

## 2021-05-25 NOTE — PROGRESS NOTES
[facility] Progress Note      Patient Name:  Pari Dixon  YOB: 1955  Medical Record Number:  [id number]    Encounter Date: 5/26/2021      CHIEF COMPLAINT: Stage I L breast ca s/p lumpectomy     HPI:     72year old female t Kaushik Blake. CVS: S1S2, regular  Rhythm, no murmurs. Lungs: Clear to auscultation and percussion. Abdomen: Soft, non tender, no hepato-splenomegaly. Extremities:  No edema.   CNS: no focal deficit  Breasts: Both are soft, no masses or axillary adenopat

## 2021-05-26 ENCOUNTER — OFFICE VISIT (OUTPATIENT)
Dept: HEMATOLOGY/ONCOLOGY | Facility: HOSPITAL | Age: 66
End: 2021-05-26
Attending: INTERNAL MEDICINE
Payer: MEDICARE

## 2021-05-26 VITALS
DIASTOLIC BLOOD PRESSURE: 65 MMHG | HEIGHT: 62.01 IN | TEMPERATURE: 97 F | RESPIRATION RATE: 16 BRPM | WEIGHT: 107 LBS | OXYGEN SATURATION: 99 % | SYSTOLIC BLOOD PRESSURE: 102 MMHG | BODY MASS INDEX: 19.44 KG/M2 | HEART RATE: 75 BPM

## 2021-05-26 DIAGNOSIS — C50.212 MALIGNANT NEOPLASM OF UPPER-INNER QUADRANT OF LEFT BREAST IN FEMALE, ESTROGEN RECEPTOR POSITIVE (HCC): Primary | ICD-10-CM

## 2021-05-26 DIAGNOSIS — Z17.0 MALIGNANT NEOPLASM OF UPPER-INNER QUADRANT OF LEFT BREAST IN FEMALE, ESTROGEN RECEPTOR POSITIVE (HCC): Primary | ICD-10-CM

## 2021-05-26 PROCEDURE — 99213 OFFICE O/P EST LOW 20 MIN: CPT | Performed by: INTERNAL MEDICINE

## 2021-05-26 NOTE — PROGRESS NOTES
Education Record    Learner:  Patient    Disease / Diagnosis:  Hx of breast cancer  Barriers / Limitations:  None   Comments:    Method:  Discussion   Comments:    General Topics:  Plan of care reviewed   Comments:    Outcome:  Shows understanding   Commen

## 2021-06-15 ENCOUNTER — LAB ENCOUNTER (OUTPATIENT)
Dept: LAB | Age: 66
End: 2021-06-15
Attending: FAMILY MEDICINE
Payer: MEDICARE

## 2021-06-15 DIAGNOSIS — Z78.9 VEGETARIAN DIET: ICD-10-CM

## 2021-06-15 DIAGNOSIS — E55.9 VITAMIN D DEFICIENCY: ICD-10-CM

## 2021-06-15 DIAGNOSIS — E78.00 HYPERCHOLESTEREMIA: ICD-10-CM

## 2021-06-15 DIAGNOSIS — E78.00 HYPERCHOLESTEREMIA: Primary | ICD-10-CM

## 2021-06-15 PROCEDURE — 81001 URINALYSIS AUTO W/SCOPE: CPT

## 2021-06-15 PROCEDURE — 80053 COMPREHEN METABOLIC PANEL: CPT

## 2021-06-15 PROCEDURE — 80061 LIPID PANEL: CPT

## 2021-06-15 PROCEDURE — 82607 VITAMIN B-12: CPT

## 2021-06-15 PROCEDURE — 84443 ASSAY THYROID STIM HORMONE: CPT

## 2021-06-15 PROCEDURE — 85025 COMPLETE CBC W/AUTO DIFF WBC: CPT

## 2021-06-15 PROCEDURE — 36415 COLL VENOUS BLD VENIPUNCTURE: CPT

## 2021-06-15 PROCEDURE — 82306 VITAMIN D 25 HYDROXY: CPT

## 2021-08-09 ENCOUNTER — HOSPITAL ENCOUNTER (OUTPATIENT)
Dept: MAMMOGRAPHY | Facility: HOSPITAL | Age: 66
Discharge: HOME | End: 2021-08-09
Attending: INTERNAL MEDICINE
Payer: MEDICARE

## 2021-08-09 DIAGNOSIS — Z01.419 WELL WOMAN EXAM: ICD-10-CM

## 2021-08-09 PROCEDURE — 77067 SCR MAMMO BI INCL CAD: CPT | Performed by: INTERNAL MEDICINE

## 2021-08-09 PROCEDURE — 77063 BREAST TOMOSYNTHESIS BI: CPT | Performed by: INTERNAL MEDICINE

## 2021-09-28 NOTE — PROGRESS NOTES
[facility] Progress Note      Patient Name:  Yulisa Monroe  YOB: 1955  Medical Record Number:  [id number]    Encounter Date: 9/29/2021    CHIEF COMPLAINT: Stage I L breast ca s/p lumpectomy     HPI:     77year old female [place] hepato-splenomegaly. Extremities:  No edema. CNS: no focal deficit  Breasts: Both are soft, no masses or axillary adenopathy. Emotional well being: Patient's emotional well being was assessed.   No issues requiring acute psychosocial intervention were

## 2021-09-29 ENCOUNTER — OFFICE VISIT (OUTPATIENT)
Dept: HEMATOLOGY/ONCOLOGY | Facility: HOSPITAL | Age: 66
End: 2021-09-29
Attending: INTERNAL MEDICINE
Payer: MEDICARE

## 2021-09-29 VITALS
WEIGHT: 108 LBS | SYSTOLIC BLOOD PRESSURE: 101 MMHG | TEMPERATURE: 98 F | BODY MASS INDEX: 19.62 KG/M2 | HEIGHT: 62.01 IN | OXYGEN SATURATION: 99 % | RESPIRATION RATE: 16 BRPM | HEART RATE: 69 BPM | DIASTOLIC BLOOD PRESSURE: 69 MMHG

## 2021-09-29 DIAGNOSIS — M81.0 OSTEOPOROSIS, UNSPECIFIED OSTEOPOROSIS TYPE, UNSPECIFIED PATHOLOGICAL FRACTURE PRESENCE: ICD-10-CM

## 2021-09-29 DIAGNOSIS — Z17.0 MALIGNANT NEOPLASM OF UPPER-INNER QUADRANT OF LEFT BREAST IN FEMALE, ESTROGEN RECEPTOR POSITIVE (HCC): Primary | ICD-10-CM

## 2021-09-29 DIAGNOSIS — C50.212 MALIGNANT NEOPLASM OF UPPER-INNER QUADRANT OF LEFT BREAST IN FEMALE, ESTROGEN RECEPTOR POSITIVE (HCC): Primary | ICD-10-CM

## 2021-09-29 PROCEDURE — 99213 OFFICE O/P EST LOW 20 MIN: CPT | Performed by: INTERNAL MEDICINE

## 2021-09-29 NOTE — PROGRESS NOTES
Education Record    Learner:  Patient    Disease / Diagnosis:4 month follow up for breast cancer    Barriers / Limitations:  None   Comments:    Method:  Discussion   Comments:    General Topics:  Plan of care reviewed   Comments:    Outcome:  Shows unders

## 2021-10-20 ENCOUNTER — IMMUNIZATION (OUTPATIENT)
Dept: FAMILY MEDICINE CLINIC | Facility: CLINIC | Age: 66
End: 2021-10-20
Payer: MEDICARE

## 2021-10-20 DIAGNOSIS — Z23 NEED FOR VACCINATION: Primary | ICD-10-CM

## 2021-10-20 PROCEDURE — G0008 ADMIN INFLUENZA VIRUS VAC: HCPCS | Performed by: FAMILY MEDICINE

## 2021-10-20 PROCEDURE — 90662 IIV NO PRSV INCREASED AG IM: CPT | Performed by: FAMILY MEDICINE

## 2021-10-22 ENCOUNTER — MED REC SCAN ONLY (OUTPATIENT)
Dept: FAMILY MEDICINE CLINIC | Facility: CLINIC | Age: 66
End: 2021-10-22

## 2021-10-27 DIAGNOSIS — M81.0 AGE-RELATED OSTEOPOROSIS WITHOUT CURRENT PATHOLOGICAL FRACTURE: ICD-10-CM

## 2021-10-28 DIAGNOSIS — M81.0 AGE-RELATED OSTEOPOROSIS WITHOUT CURRENT PATHOLOGICAL FRACTURE: ICD-10-CM

## 2021-10-28 RX ORDER — ALENDRONATE SODIUM 70 MG/1
70 TABLET ORAL WEEKLY
Qty: 8 TABLET | Refills: 0 | Status: SHIPPED | OUTPATIENT
Start: 2021-10-28 | End: 2021-12-30

## 2021-10-28 RX ORDER — ALENDRONATE SODIUM 70 MG/1
TABLET ORAL
Qty: 12 TABLET | Refills: 0 | OUTPATIENT
Start: 2021-10-28

## 2021-12-07 DIAGNOSIS — F43.9 STRESS: ICD-10-CM

## 2021-12-07 RX ORDER — SERTRALINE HYDROCHLORIDE 25 MG/1
TABLET, FILM COATED ORAL
Qty: 90 TABLET | Refills: 0 | Status: SHIPPED | OUTPATIENT
Start: 2021-12-07 | End: 2022-02-02

## 2021-12-07 NOTE — TELEPHONE ENCOUNTER
SERTRALINE 25MG TABLETS    Please see pended medications. Please sign if appropriate.       Thank you      Last OV: 04/19/2021      Last refill: 04/19/2021 for 90 tabs    Upcoming appt is scheduled for 02/22/2021

## 2021-12-30 DIAGNOSIS — M81.0 AGE-RELATED OSTEOPOROSIS WITHOUT CURRENT PATHOLOGICAL FRACTURE: ICD-10-CM

## 2021-12-30 RX ORDER — ALENDRONATE SODIUM 70 MG/1
TABLET ORAL
Qty: 8 TABLET | Refills: 0 | Status: SHIPPED | OUTPATIENT
Start: 2021-12-30 | End: 2022-02-02

## 2022-01-25 NOTE — PROGRESS NOTES
[facility] Progress Note      Patient Name:  Jessica Wilde  YOB: 1955  Medical Record Number:  [id number]    Encounter Date:1/26/2022    CHIEF COMPLAINT: Stage I L breast ca s/p lumpectomy     HPI:     77year old female that percussion. Abdomen: Soft, non tender, no hepato-splenomegaly. Extremities:  No edema. CNS: no focal deficit  Breasts: Both are soft, no masses or axillary adenopathy. Emotional well being: Patient's emotional well being was assessed.   No issues requ

## 2022-01-26 ENCOUNTER — OFFICE VISIT (OUTPATIENT)
Dept: HEMATOLOGY/ONCOLOGY | Facility: HOSPITAL | Age: 67
End: 2022-01-26
Attending: INTERNAL MEDICINE
Payer: MEDICARE

## 2022-01-26 VITALS
DIASTOLIC BLOOD PRESSURE: 72 MMHG | SYSTOLIC BLOOD PRESSURE: 108 MMHG | OXYGEN SATURATION: 100 % | RESPIRATION RATE: 16 BRPM | HEIGHT: 62.01 IN | BODY MASS INDEX: 19.53 KG/M2 | HEART RATE: 75 BPM | WEIGHT: 107.5 LBS | TEMPERATURE: 98 F

## 2022-01-26 DIAGNOSIS — Z17.0 MALIGNANT NEOPLASM OF UPPER-INNER QUADRANT OF LEFT BREAST IN FEMALE, ESTROGEN RECEPTOR POSITIVE (HCC): Primary | ICD-10-CM

## 2022-01-26 DIAGNOSIS — C50.212 MALIGNANT NEOPLASM OF UPPER-INNER QUADRANT OF LEFT BREAST IN FEMALE, ESTROGEN RECEPTOR POSITIVE (HCC): Primary | ICD-10-CM

## 2022-01-26 PROCEDURE — 99213 OFFICE O/P EST LOW 20 MIN: CPT | Performed by: INTERNAL MEDICINE

## 2022-01-26 NOTE — PROGRESS NOTES
Education Record    Learner:  Patient    Disease / Diagnosis: left breast cancer    Barriers / Limitations:  None   Comments:    Method:  Discussion   Comments:    General Topics:  Plan of care reviewed   Comments:    Outcome:  Shows understanding   Commen

## 2022-02-02 ENCOUNTER — TELEMEDICINE (OUTPATIENT)
Dept: FAMILY MEDICINE CLINIC | Facility: CLINIC | Age: 67
End: 2022-02-02
Payer: MEDICARE

## 2022-02-02 DIAGNOSIS — E55.9 VITAMIN D DEFICIENCY: ICD-10-CM

## 2022-02-02 DIAGNOSIS — M81.0 AGE-RELATED OSTEOPOROSIS WITHOUT CURRENT PATHOLOGICAL FRACTURE: ICD-10-CM

## 2022-02-02 DIAGNOSIS — Z78.9 VEGETARIAN DIET: ICD-10-CM

## 2022-02-02 DIAGNOSIS — E78.00 HYPERCHOLESTEREMIA: Primary | ICD-10-CM

## 2022-02-02 DIAGNOSIS — F43.9 STRESS: ICD-10-CM

## 2022-02-02 PROCEDURE — 99214 OFFICE O/P EST MOD 30 MIN: CPT | Performed by: FAMILY MEDICINE

## 2022-02-02 RX ORDER — SERTRALINE HYDROCHLORIDE 25 MG/1
25 TABLET, FILM COATED ORAL DAILY
Qty: 90 TABLET | Refills: 1 | Status: SHIPPED | OUTPATIENT
Start: 2022-02-02

## 2022-02-02 RX ORDER — ALENDRONATE SODIUM 70 MG/1
70 TABLET ORAL WEEKLY
Qty: 12 TABLET | Refills: 1 | Status: SHIPPED | OUTPATIENT
Start: 2022-02-02

## 2022-02-02 NOTE — PATIENT INSTRUCTIONS
Continue current meds. Healthy diet. Do weightbearing exercises. Continue calcium 1000 mg daily with vitamin D3 1000 units daily over-the-counter. Do fasting prior to the work prior super visit. Schedule super visit for April/May 2022.

## 2022-02-02 NOTE — PROGRESS NOTES
Subjective Osteoporosis, stress, vitamin D deficiency, vegan diet    HPI:   Stephani Suggs verbally consents to a Virtual/Telephone Check-In service on 02/02/22. Patient understands and accepts financial responsibility for any deductible, co-insurance and/or co-pays associated with this service. This visit is conducted using Telemedicine with live, interactive video and audio. I returned [address] call by secure video chat, verified date of birth, and discussed their current concerns:   Osteoporosis patient is taking Fosamax. Bone density scan is due October of this year. Tolerates medication well she is taking calcium vitamin D deficiency. Stress patient is taking sertraline 25 mg daily medication is helping needs refill of the medication doing well    Vitamin D deficiency in the past will check levels due to osteoporosis. Monitor    Vegan diet we will check vitamin B12 level replace as needed    Hypercholesterolemia numbers were elevated in the past.  She is trying to eat well. Monitor   cholesterol levels. No Further Nursing Notes to Review  Allergies Reviewed  Medications   Reviewed  Problem List Reviewed           REVIEW OF SYSTEMS:  Pertinent items are noted in HPI. Physical Exam:  alert, appears stated age and cooperative, Normocephalic, without obvious abnormality, atraumatic, normal findings: lips normal without lesions, Speaking in full sentences comfortably, Normal work of breathing, Skin color, texture, turgor normal. No rashes or lesions and age appropriate and casually dressed        Assessment    Diagnoses and all orders for this visit:    Hypercholesteremia  -     TSH W REFLEX TO FREE T4; Future  -     LIPID PANEL; Future  -     COMP METABOLIC PANEL (14); Future  -     URINALYSIS WITH CULTURE REFLEX; Future    Stress  -     sertraline 25 MG Oral Tab; Take 1 tablet (25 mg total) by mouth daily.     Age-related osteoporosis without current pathological fracture  -     COMP METABOLIC PANEL (14); Future  -     CBC WITH DIFFERENTIAL WITH PLATELET; Future  -     alendronate 70 MG Oral Tab; Take 1 tablet (70 mg total) by mouth once a week. Vitamin D deficiency  -     VITAMIN D, 25-HYDROXY; Future    Vegetarian diet  -     VITAMIN B12; Future     Continue current meds. Healthy diet. Do weightbearing exercises. Continue calcium 1000 mg daily with vitamin D3 1000 units daily over-the-counter. Do fasting prior to the work prior super visit. Schedule super visit for April/May 2022. Follow up: For super visit April/May 2022.       Sabas Artis MD

## 2022-03-15 DIAGNOSIS — F43.9 STRESS: ICD-10-CM

## 2022-03-15 RX ORDER — SERTRALINE HYDROCHLORIDE 25 MG/1
TABLET, FILM COATED ORAL
Qty: 90 TABLET | Refills: 1 | OUTPATIENT
Start: 2022-03-15

## 2022-05-25 ENCOUNTER — APPOINTMENT (OUTPATIENT)
Dept: HEMATOLOGY/ONCOLOGY | Facility: HOSPITAL | Age: 67
End: 2022-05-25
Attending: INTERNAL MEDICINE
Payer: MEDICARE

## 2022-05-27 ENCOUNTER — OFFICE VISIT (OUTPATIENT)
Dept: HEMATOLOGY/ONCOLOGY | Facility: HOSPITAL | Age: 67
End: 2022-05-27
Attending: INTERNAL MEDICINE
Payer: MEDICARE

## 2022-05-27 VITALS
DIASTOLIC BLOOD PRESSURE: 77 MMHG | OXYGEN SATURATION: 99 % | HEART RATE: 92 BPM | WEIGHT: 98 LBS | BODY MASS INDEX: 18 KG/M2 | RESPIRATION RATE: 16 BRPM | SYSTOLIC BLOOD PRESSURE: 116 MMHG | TEMPERATURE: 97 F

## 2022-05-27 DIAGNOSIS — Z17.0 MALIGNANT NEOPLASM OF UPPER-INNER QUADRANT OF LEFT BREAST IN FEMALE, ESTROGEN RECEPTOR POSITIVE (HCC): ICD-10-CM

## 2022-05-27 DIAGNOSIS — Z78.0 POSTMENOPAUSAL: Primary | ICD-10-CM

## 2022-05-27 DIAGNOSIS — C50.212 MALIGNANT NEOPLASM OF UPPER-INNER QUADRANT OF LEFT BREAST IN FEMALE, ESTROGEN RECEPTOR POSITIVE (HCC): ICD-10-CM

## 2022-05-27 PROCEDURE — 99213 OFFICE O/P EST LOW 20 MIN: CPT | Performed by: INTERNAL MEDICINE

## 2022-05-27 NOTE — PROGRESS NOTES
5 beats of WCT on tele Education Record    Learner:  Patient    Disease / [address] breast cancer    Barriers / Limitations:  None   Comments:    Method:  Discussion   Comments:    General Topics:  Plan of care reviewed   Comments:    Outcome:  Shows understanding   Comments:  Breast imaging up to date. Feeling well.

## 2022-06-15 ENCOUNTER — TELEPHONE (OUTPATIENT)
Dept: FAMILY MEDICINE CLINIC | Facility: CLINIC | Age: 67
End: 2022-06-15

## 2022-06-15 ENCOUNTER — OFFICE VISIT (OUTPATIENT)
Dept: FAMILY MEDICINE CLINIC | Facility: CLINIC | Age: 67
End: 2022-06-15
Payer: MEDICARE

## 2022-06-15 VITALS
TEMPERATURE: 98 F | HEART RATE: 92 BPM | DIASTOLIC BLOOD PRESSURE: 68 MMHG | WEIGHT: 98 LBS | SYSTOLIC BLOOD PRESSURE: 102 MMHG | BODY MASS INDEX: 18.03 KG/M2 | HEIGHT: 62 IN | RESPIRATION RATE: 14 BRPM

## 2022-06-15 DIAGNOSIS — F43.9 STRESS: ICD-10-CM

## 2022-06-15 DIAGNOSIS — Z78.9 VEGETARIAN DIET: ICD-10-CM

## 2022-06-15 DIAGNOSIS — E55.9 VITAMIN D DEFICIENCY: ICD-10-CM

## 2022-06-15 DIAGNOSIS — C50.212 MALIGNANT NEOPLASM OF UPPER-INNER QUADRANT OF LEFT BREAST IN FEMALE, ESTROGEN RECEPTOR POSITIVE (HCC): ICD-10-CM

## 2022-06-15 DIAGNOSIS — Z23 NEED FOR STREPTOCOCCUS PNEUMONIAE VACCINATION: ICD-10-CM

## 2022-06-15 DIAGNOSIS — Z00.00 ENCOUNTER FOR ANNUAL HEALTH EXAMINATION: Primary | ICD-10-CM

## 2022-06-15 DIAGNOSIS — Z17.0 MALIGNANT NEOPLASM OF UPPER-INNER QUADRANT OF LEFT BREAST IN FEMALE, ESTROGEN RECEPTOR POSITIVE (HCC): ICD-10-CM

## 2022-06-15 DIAGNOSIS — M81.0 AGE-RELATED OSTEOPOROSIS WITHOUT CURRENT PATHOLOGICAL FRACTURE: ICD-10-CM

## 2022-06-15 DIAGNOSIS — I05.9 MITRAL VALVE DISORDER: ICD-10-CM

## 2022-06-15 DIAGNOSIS — E78.00 HYPERCHOLESTEREMIA: ICD-10-CM

## 2022-06-15 PROBLEM — E46 PROTEIN-CALORIE MALNUTRITION, UNSPECIFIED SEVERITY (HCC): Status: ACTIVE | Noted: 2022-06-15

## 2022-06-15 RX ORDER — SERTRALINE HYDROCHLORIDE 25 MG/1
25 TABLET, FILM COATED ORAL DAILY
Qty: 90 TABLET | Refills: 1 | Status: SHIPPED | OUTPATIENT
Start: 2022-06-15

## 2022-06-15 NOTE — TELEPHONE ENCOUNTER
Pt missed her [facility] appt yesterday, pt states she had an emergency. Pt asking Dr. Claude Emily if she would have a cancellation sooner than her rescheduled emily 9/21/2022 she would be available.

## 2022-06-21 ENCOUNTER — TELEPHONE (OUTPATIENT)
Dept: FAMILY MEDICINE CLINIC | Facility: CLINIC | Age: 67
End: 2022-06-21

## 2022-06-21 NOTE — TELEPHONE ENCOUNTER
Call to pt-reports received pneumonia vaccine-record shows pneumovax 23 given in right deltoid-at OV 6/15/22.  sts had pain with injection but chose to do in right arm due to being right handed, so she would continue to use and move that arm and hand. sts by 10 pm that evening, \"right hand felt very heavy-like it weighed 500 lbs, had slight swelling at injection site and pain in right shoulder into upper right side of back\". sts did not feel too bad over wkend but since yesterday has been more uncomfortable. Reports sharp, constant, 7/10 pain continues in right shoulder, right upper back and extends down right arm almost to wrist. Tylenol or ibuprofen only gets pain down to 5/10. sts can not lift her right arm without support and much difficulty using RUE for any ADLs. Can not sleep on right side due pain. Denies any fever, numbness, tingling, redness, or other sx. Advised will update dr Jerman Conley and will call back w recommendations. Patient voices understanding/agrees with plan/no further questions. Updated dr Jerman Conley w above info. She sts pain and swelling are not unusual with vaccines and may be self limiting/resolve on it's own, but inability to lift arm unassisted should be evaluated-offer appt tomorrow w dr Trini Carter and advise to stop tylenol and take ibuprofen 400 mg tid w food. Call back to pt-advised of above info and instructions from dr Jerman Conley. Reinforced taking ibuprofen w food and explained rationale. Pt sts has been using wrapped ice to right shoulder and injection site for 20-30 minutes a couple times per day. Advised to decrease icing to no more than 10-15 mnutes at a time-can repeat every 3-4 hours if helpful. Explained rationales. Offered appt options for tomorrow w dr Trini Carter, as dr Jerman Conley will be out of ofc. appt scheduled for 840 am.   Patient voices understanding/agrees with plan/no further questions.

## 2022-06-21 NOTE — TELEPHONE ENCOUNTER
Pt was given pneumonia shot at visit 6/15/2022. That evening, pt's shoulder became heavy and uncomfortable. Pt used Tylenol and ice, but since yesterday, pt having more pain in shoulder, and cannot lift hand very high.

## 2022-06-22 ENCOUNTER — HOSPITAL ENCOUNTER (OUTPATIENT)
Dept: ULTRASOUND IMAGING | Facility: HOSPITAL | Age: 67
Discharge: HOME | End: 2022-06-22
Attending: STUDENT IN AN ORGANIZED HEALTH CARE EDUCATION/TRAINING PROGRAM
Payer: MEDICARE

## 2022-06-22 ENCOUNTER — OFFICE VISIT (OUTPATIENT)
Dept: FAMILY MEDICINE CLINIC | Facility: CLINIC | Age: 67
End: 2022-06-22
Payer: MEDICARE

## 2022-06-22 VITALS
WEIGHT: 97 LBS | HEIGHT: 62 IN | TEMPERATURE: 98 F | RESPIRATION RATE: 16 BRPM | HEART RATE: 80 BPM | BODY MASS INDEX: 17.85 KG/M2

## 2022-06-22 DIAGNOSIS — M25.511 ACUTE PAIN OF RIGHT SHOULDER: ICD-10-CM

## 2022-06-22 DIAGNOSIS — R22.31 LOCALIZED SWELLING OF RIGHT UPPER EXTREMITY: Primary | ICD-10-CM

## 2022-06-22 DIAGNOSIS — R22.31 LOCALIZED SWELLING OF RIGHT UPPER EXTREMITY: ICD-10-CM

## 2022-06-22 PROCEDURE — 99214 OFFICE O/P EST MOD 30 MIN: CPT | Performed by: STUDENT IN AN ORGANIZED HEALTH CARE EDUCATION/TRAINING PROGRAM

## 2022-06-22 PROCEDURE — 3008F BODY MASS INDEX DOCD: CPT | Performed by: STUDENT IN AN ORGANIZED HEALTH CARE EDUCATION/TRAINING PROGRAM

## 2022-06-22 PROCEDURE — 93971 EXTREMITY STUDY: CPT | Performed by: STUDENT IN AN ORGANIZED HEALTH CARE EDUCATION/TRAINING PROGRAM

## 2022-06-27 ENCOUNTER — TELEPHONE (OUTPATIENT)
Dept: NEUROLOGY | Facility: CLINIC | Age: 67
End: 2022-06-27

## 2022-06-27 ENCOUNTER — TELEPHONE (OUTPATIENT)
Dept: FAMILY MEDICINE CLINIC | Facility: CLINIC | Age: 67
End: 2022-06-27

## 2022-06-27 NOTE — TELEPHONE ENCOUNTER
Elana HAZEL from Dr. Yanes School office calling to make appt for pt to be seen this week. Please assist with scheduling.

## 2022-06-27 NOTE — TELEPHONE ENCOUNTER
EBENEZER to pt. She has not heard from Dr. Augie Schmitt office yet. I told pt I will check on her appt in the morning. Pt was instructed to call and ask for me if she has not heard from Dr. Augie Schmitt office by 10:00 am. Pt verbalized understanding.

## 2022-06-27 NOTE — TELEPHONE ENCOUNTER
Pt walked in the office today. She continues to have right upper arm pain that radiates to her right scapula and down her right arm. She denies any numbness or tingling. She had a pneumonia 23 vaccine on 06/15/2022. She called the office on 06/21 reporting her right hand felt very heavy like it weighed 500 lbs. She had pain at the injection site and pain in her right shoulder into her upper back. Reported sharp,constant,pain a 7/10. Dr. Kade Quispe saw her in the office on 06/21. She has used 400 mg tid of ibuprofen and ice. There was no redness at the injection site but had some swelling of the RUE. Dr. Matthew Pompa ordered a US venous doppler of the right arm and it was negative. I spoke to Dr. Willian Gallardo and he advised pt see neuro for eval. I called and spoke to [facility] at Dr. Linda Chapa office. I gave her the information above. [facility]  is going to see if Dr. Pearl Queen can see the patient tomorrow at 1:20. [facility] took the patients information and will call her with an appointment time. I informed patient their office will be calling her with an appointment time. I instructed her to call me today at 3:30 if she has not heard form their office with an appointment time. Pt agreed to plan and verbalized understanding.

## 2022-06-29 ENCOUNTER — OFFICE VISIT (OUTPATIENT)
Dept: NEUROLOGY | Facility: CLINIC | Age: 67
End: 2022-06-29
Payer: MEDICARE

## 2022-06-29 VITALS
SYSTOLIC BLOOD PRESSURE: 99 MMHG | BODY MASS INDEX: 18 KG/M2 | DIASTOLIC BLOOD PRESSURE: 58 MMHG | WEIGHT: 99 LBS | HEART RATE: 88 BPM | RESPIRATION RATE: 16 BRPM

## 2022-06-29 DIAGNOSIS — M25.511 ACUTE PAIN OF RIGHT SHOULDER: Primary | ICD-10-CM

## 2022-06-29 PROCEDURE — 3078F DIAST BP <80 MM HG: CPT | Performed by: OTHER

## 2022-06-29 PROCEDURE — 99204 OFFICE O/P NEW MOD 45 MIN: CPT | Performed by: OTHER

## 2022-06-29 PROCEDURE — 3074F SYST BP LT 130 MM HG: CPT | Performed by: OTHER

## 2022-06-29 NOTE — PROGRESS NOTES
Pt reports on Sammi 15, 2022 she had vaccine in right arm and she states she had significant arm pain and heaviness. Denies numbness and tingling    Pt reports pain is less today, she has taking motrin and tylenol and used heating pad.

## 2022-08-10 ENCOUNTER — HOSPITAL ENCOUNTER (OUTPATIENT)
Dept: MAMMOGRAPHY | Facility: HOSPITAL | Age: 67
Discharge: HOME | End: 2022-08-10
Attending: INTERNAL MEDICINE
Payer: MEDICARE

## 2022-08-10 DIAGNOSIS — C50.212 MALIGNANT NEOPLASM OF UPPER-INNER QUADRANT OF LEFT BREAST IN FEMALE, ESTROGEN RECEPTOR POSITIVE (HCC): ICD-10-CM

## 2022-08-10 DIAGNOSIS — Z17.0 MALIGNANT NEOPLASM OF UPPER-INNER QUADRANT OF LEFT BREAST IN FEMALE, ESTROGEN RECEPTOR POSITIVE (HCC): ICD-10-CM

## 2022-08-10 PROCEDURE — 77063 BREAST TOMOSYNTHESIS BI: CPT | Performed by: INTERNAL MEDICINE

## 2022-08-10 PROCEDURE — 77067 SCR MAMMO BI INCL CAD: CPT | Performed by: INTERNAL MEDICINE

## 2022-09-27 ENCOUNTER — APPOINTMENT (OUTPATIENT)
Dept: HEMATOLOGY/ONCOLOGY | Facility: HOSPITAL | Age: 67
End: 2022-09-27
Attending: INTERNAL MEDICINE

## 2022-10-03 ENCOUNTER — HOSPITAL ENCOUNTER (OUTPATIENT)
Dept: BONE DENSITY | Age: 67
Discharge: HOME | End: 2022-10-03
Attending: INTERNAL MEDICINE
Payer: MEDICARE

## 2022-10-03 DIAGNOSIS — Z78.0 POSTMENOPAUSAL: ICD-10-CM

## 2022-10-03 PROCEDURE — 77080 DXA BONE DENSITY AXIAL: CPT | Performed by: INTERNAL MEDICINE

## 2022-10-07 ENCOUNTER — TELEPHONE (OUTPATIENT)
Dept: FAMILY MEDICINE CLINIC | Facility: CLINIC | Age: 67
End: 2022-10-07

## 2022-10-08 NOTE — TELEPHONE ENCOUNTER
We have received note from Dr. Sonia Canela to address patient bone density scan I would like her to set up an appointment and see me in the office October or November to go over results.   Thank you

## 2022-10-09 DIAGNOSIS — M81.0 AGE-RELATED OSTEOPOROSIS WITHOUT CURRENT PATHOLOGICAL FRACTURE: ICD-10-CM

## 2022-10-10 ENCOUNTER — TELEPHONE (OUTPATIENT)
Dept: FAMILY MEDICINE CLINIC | Facility: CLINIC | Age: 67
End: 2022-10-10

## 2022-10-10 DIAGNOSIS — M81.0 AGE-RELATED OSTEOPOROSIS WITHOUT CURRENT PATHOLOGICAL FRACTURE: ICD-10-CM

## 2022-10-10 RX ORDER — ALENDRONATE SODIUM 70 MG/1
TABLET ORAL
Qty: 8 TABLET | Refills: 0 | Status: SHIPPED | OUTPATIENT
Start: 2022-10-10

## 2022-10-10 RX ORDER — ALENDRONATE SODIUM 70 MG/1
TABLET ORAL
Qty: 12 TABLET | Refills: 1 | OUTPATIENT
Start: 2022-10-10

## 2022-10-10 NOTE — TELEPHONE ENCOUNTER
Pt would like refill of sertraline 25 MG Oral Tab she has 3 pills left.      [address], [address] [facility], [phone], [phone]    Thank you

## 2022-10-10 NOTE — TELEPHONE ENCOUNTER
LOV: 06/22/2022--acute w/  (arm pain)  6/15/22--px w/     NOV: 11/09/2022    LF:  06/15/2022, #90, ref 1    Call to [address]. She confirms pt has 1 refill remaining of sertraline. They will fill today. Call to [facility]. [place] [facility] informing her that she has 1 refill remaining of Sertraline, #90, at pharmacy. Pharmacy to fill today. Provided call back # and ofc hours for additional questions, if any.

## 2022-10-26 ENCOUNTER — OFFICE VISIT (OUTPATIENT)
Dept: HEMATOLOGY/ONCOLOGY | Facility: HOSPITAL | Age: 67
End: 2022-10-26
Attending: INTERNAL MEDICINE
Payer: MEDICARE

## 2022-10-26 VITALS
DIASTOLIC BLOOD PRESSURE: 68 MMHG | SYSTOLIC BLOOD PRESSURE: 105 MMHG | BODY MASS INDEX: 18 KG/M2 | HEART RATE: 78 BPM | WEIGHT: 101 LBS | OXYGEN SATURATION: 99 % | RESPIRATION RATE: 18 BRPM | TEMPERATURE: 98 F

## 2022-10-26 DIAGNOSIS — Z17.0 MALIGNANT NEOPLASM OF UPPER-INNER QUADRANT OF LEFT BREAST IN FEMALE, ESTROGEN RECEPTOR POSITIVE (HCC): Primary | ICD-10-CM

## 2022-10-26 DIAGNOSIS — M81.0 OSTEOPOROSIS, UNSPECIFIED OSTEOPOROSIS TYPE, UNSPECIFIED PATHOLOGICAL FRACTURE PRESENCE: ICD-10-CM

## 2022-10-26 DIAGNOSIS — C50.212 MALIGNANT NEOPLASM OF UPPER-INNER QUADRANT OF LEFT BREAST IN FEMALE, ESTROGEN RECEPTOR POSITIVE (HCC): Primary | ICD-10-CM

## 2022-10-26 PROCEDURE — 99213 OFFICE O/P EST LOW 20 MIN: CPT | Performed by: INTERNAL MEDICINE

## 2022-10-26 NOTE — PROGRESS NOTES
Education Record    Learner:  Patient    Disease / [address] breast cancer       Barriers / Limitations:  None   Comments:    Method:  Discussion   Comments:    General Topics:  Plan of care reviewed   Comments:    Outcome:  Shows understanding   Comments:  Breast imaging up to date. dexa scan 10/22  Pt feeling well.

## 2022-11-09 ENCOUNTER — OFFICE VISIT (OUTPATIENT)
Dept: FAMILY MEDICINE CLINIC | Facility: CLINIC | Age: 67
End: 2022-11-09
Payer: MEDICARE

## 2022-11-09 ENCOUNTER — TELEPHONE (OUTPATIENT)
Dept: FAMILY MEDICINE CLINIC | Facility: CLINIC | Age: 67
End: 2022-11-09

## 2022-11-09 VITALS
HEART RATE: 74 BPM | TEMPERATURE: 98 F | WEIGHT: 101 LBS | SYSTOLIC BLOOD PRESSURE: 100 MMHG | DIASTOLIC BLOOD PRESSURE: 66 MMHG | RESPIRATION RATE: 16 BRPM | BODY MASS INDEX: 18.58 KG/M2 | HEIGHT: 62 IN

## 2022-11-09 DIAGNOSIS — M81.0 AGE-RELATED OSTEOPOROSIS WITHOUT CURRENT PATHOLOGICAL FRACTURE: Primary | ICD-10-CM

## 2022-11-09 PROCEDURE — 3074F SYST BP LT 130 MM HG: CPT | Performed by: FAMILY MEDICINE

## 2022-11-09 PROCEDURE — 99213 OFFICE O/P EST LOW 20 MIN: CPT | Performed by: FAMILY MEDICINE

## 2022-11-09 PROCEDURE — 3078F DIAST BP <80 MM HG: CPT | Performed by: FAMILY MEDICINE

## 2022-11-09 PROCEDURE — 3008F BODY MASS INDEX DOCD: CPT | Performed by: FAMILY MEDICINE

## 2022-11-09 RX ORDER — ALENDRONATE SODIUM 70 MG/1
70 TABLET ORAL
Qty: 12 TABLET | Refills: 1 | Status: SHIPPED | OUTPATIENT
Start: 2022-11-09

## 2022-11-09 NOTE — PATIENT INSTRUCTIONS
Start alendronate 70 mg 1 tablet on empty stomach 8 ounces of water stay upright for half an hour after taking medication no food for half an hour after taking medication. Take calcium 1000 mg daily with vitamin D3 1000 mg daily. Weightbearing exercise.,   Follow-up in 6 months for super visit/medication check.

## 2022-11-09 NOTE — TELEPHONE ENCOUNTER
Pt stopped at  after apt today 11/9/22 requesting flu vaccination with [facility] only. Spoke with Dr Joe Hidalgo regarding this, states pt was advised that it is not guaranteed that the vaccination can be given by [facility] Needs as she may be busy at time/day of pt's apt. Pt states she will call the office to schedule the flu vaccination instead.

## 2023-02-22 ENCOUNTER — APPOINTMENT (OUTPATIENT)
Dept: HEMATOLOGY/ONCOLOGY | Facility: HOSPITAL | Age: 68
End: 2023-02-22
Attending: INTERNAL MEDICINE
Payer: MEDICARE

## 2023-04-10 DIAGNOSIS — F43.9 STRESS: ICD-10-CM

## 2023-04-10 RX ORDER — SERTRALINE HYDROCHLORIDE 25 MG/1
TABLET, FILM COATED ORAL
Qty: 30 TABLET | Refills: 0 | Status: SHIPPED | OUTPATIENT
Start: 2023-04-10

## 2023-05-10 ENCOUNTER — OFFICE VISIT (OUTPATIENT)
Dept: FAMILY MEDICINE CLINIC | Facility: CLINIC | Age: 68
End: 2023-05-10
Payer: MEDICARE

## 2023-05-10 VITALS
RESPIRATION RATE: 16 BRPM | HEIGHT: 62 IN | BODY MASS INDEX: 18.95 KG/M2 | SYSTOLIC BLOOD PRESSURE: 100 MMHG | WEIGHT: 103 LBS | TEMPERATURE: 98 F | DIASTOLIC BLOOD PRESSURE: 60 MMHG | HEART RATE: 80 BPM

## 2023-05-10 DIAGNOSIS — C50.212 MALIGNANT NEOPLASM OF UPPER-INNER QUADRANT OF LEFT BREAST IN FEMALE, ESTROGEN RECEPTOR POSITIVE (HCC): ICD-10-CM

## 2023-05-10 DIAGNOSIS — E78.00 HYPERCHOLESTEREMIA: ICD-10-CM

## 2023-05-10 DIAGNOSIS — Z12.31 SCREENING MAMMOGRAM FOR BREAST CANCER: ICD-10-CM

## 2023-05-10 DIAGNOSIS — Z17.0 MALIGNANT NEOPLASM OF UPPER-INNER QUADRANT OF LEFT BREAST IN FEMALE, ESTROGEN RECEPTOR POSITIVE (HCC): ICD-10-CM

## 2023-05-10 DIAGNOSIS — E55.9 VITAMIN D DEFICIENCY: ICD-10-CM

## 2023-05-10 DIAGNOSIS — Z00.00 ENCOUNTER FOR ANNUAL HEALTH EXAMINATION: Primary | ICD-10-CM

## 2023-05-10 DIAGNOSIS — F43.9 STRESS: ICD-10-CM

## 2023-05-10 DIAGNOSIS — M81.0 AGE-RELATED OSTEOPOROSIS WITHOUT CURRENT PATHOLOGICAL FRACTURE: ICD-10-CM

## 2023-05-10 DIAGNOSIS — Z78.9 VEGETARIAN DIET: ICD-10-CM

## 2023-05-10 DIAGNOSIS — Z11.1 SCREENING-PULMONARY TB: ICD-10-CM

## 2023-05-10 RX ORDER — ALENDRONATE SODIUM 70 MG/1
70 TABLET ORAL WEEKLY
Qty: 12 TABLET | Refills: 1 | Status: SHIPPED | OUTPATIENT
Start: 2023-05-10

## 2023-05-10 RX ORDER — SERTRALINE HYDROCHLORIDE 25 MG/1
25 TABLET, FILM COATED ORAL DAILY
Qty: 30 TABLET | Refills: 5 | Status: SHIPPED | OUTPATIENT
Start: 2023-05-10

## 2023-05-10 NOTE — PATIENT INSTRUCTIONS
Shingrix shingles vaccination. Tdap-  tetanus shot. Continue current meds. Watch diet for fats and carbs. Stay active. Schedule bone density scan in October. Schedule medication check in 6 months. Stephani Suggs's SCREENING SCHEDULE   Tests on this list are recommended by your physician but may not be covered, or covered at this frequency, by your insurer. Please check with your insurance carrier before scheduling to verify coverage.    PREVENTATIVE SERVICES FREQUENCY &  COVERAGE DETAILS LAST COMPLETION DATE   Diabetes Screening    Fasting Blood Sugar /  Glucose    One screening every 12 months if never tested or if previously tested but not diagnosed with pre-diabetes   One screening every 6 months if diagnosed with pre-diabetes Lab Results   Component Value Date    GLU 84 06/15/2021        Cardiovascular Disease Screening    Lipid Panel  Cholesterol  Lipoprotein (HDL)  Triglycerides Covered every 5 years for all Medicare beneficiaries without apparent signs or symptoms of cardiovascular disease Lab Results   Component Value Date    CHOLEST 214 (H) 06/15/2021    HDL 68 (H) 06/15/2021     (H) 06/15/2021    TRIG 180 (H) 06/15/2021         Electrocardiogram (EKG)   Covered if needed at [place] [employer] [place], and non-screening if indicated for medical reasons 12/02/2014      Ultrasound Screening for Abdominal Aortic Aneurysm (AAA) Covered once in a lifetime for one of the following risk factors    Men who are 73-68 years old and have ever smoked    Anyone with a family history -     Colorectal Cancer Screening  Covered for ages 52-80; only need ONE of the following:    Colonoscopy   Covered every 10 years    Covered every 2 years if patient is at high risk or previous colonoscopy was abnormal 01/18/2021    Colorectal Cancer Screening due on 01/18/2031    Flexible Sigmoidoscopy   Covered every 4 years -    Fecal Occult Blood Test Covered annually -   Bone Density Screening    Bone density screening    Covered every 2 years after age 72 if diagnosed with risk of osteoporosis or estrogen deficiency. Covered yearly for long-term glucocorticoid medication use (Steroids) Last Dexa Scan:    XR DEXA BONE DENSITOMETRY (CPT=77080) 10/03/2022      No recommendations at this time   Pap and Pelvic    Pap   Covered every 2 years for women at normal risk; Annually if at high risk -  No recommendations at this time    Chlamydia Annually if high risk -  No recommendations at this time   Screening Mammogram    Mammogram     Recommend annually for all female patients aged 36 and older    One baseline mammogram covered for patients aged 32-38 08/10/2022    Mammogram due on 08/10/2023    Immunizations    Influenza Covered once per flu season  Please get every year -  No recommendations at this time    Pneumococcal Each vaccine (Oxymruq48 & [id number]) covered once after 72 Prevnar 13: 09/29/2020    Jqhsxtiuq70: 06/15/2022     No recommendations at this time    Hepatitis B One screening covered for patients with certain risk factors   -  No recommendations at this time    Tetanus Toxoid Not covered by Medicare Part B unless medically necessary (cut with metal); may be covered with your pharmacy prescription benefits -    Tetanus, Diptheria and Pertusis TD and TDaP Not covered by Medicare Part B -  No recommendations at this time    Zoster Not covered by Medicare Part B; may be covered with your pharmacy  prescription benefits 11/13/2015  Zoster Vaccines(1 of 2) due on 01/08/2016      Stephani Suggs's SCREENING SCHEDULE   Tests on this list are recommended by your physician but may not be covered, or covered at this frequency, by your insurer. Please check with your insurance carrier before scheduling to verify coverage.    PREVENTATIVE SERVICES FREQUENCY &  COVERAGE DETAILS LAST COMPLETION DATE   Diabetes Screening    Fasting Blood Sugar /  Glucose    One screening every 12 months if never tested or if previously tested but not diagnosed with pre-diabetes   One screening every 6 months if diagnosed with pre-diabetes Lab Results   Component Value Date    GLU 84 06/15/2021        Cardiovascular Disease Screening    Lipid Panel  Cholesterol  Lipoprotein (HDL)  Triglycerides Covered every 5 years for all Medicare beneficiaries without apparent signs or symptoms of cardiovascular disease Lab Results   Component Value Date    CHOLEST 214 (H) 06/15/2021    HDL 68 (H) 06/15/2021     (H) 06/15/2021    TRIG 180 (H) 06/15/2021         Electrocardiogram (EKG)   Covered if needed at [place] to [employer], and non-screening if indicated for medical reasons 12/02/2014      Ultrasound Screening for Abdominal Aortic Aneurysm (AAA) Covered once in a lifetime for one of the following risk factors    Men who are 73-68 years old and have ever smoked    Anyone with a family history -     Colorectal Cancer Screening  Covered for ages 52-80; only need ONE of the following:    Colonoscopy   Covered every 10 years    Covered every 2 years if patient is at high risk or previous colonoscopy was abnormal 01/18/2021    Colorectal Cancer Screening due on 01/18/2031    Flexible Sigmoidoscopy   Covered every 4 years -    Fecal Occult Blood Test Covered annually -   Bone Density Screening    Bone density screening    Covered every 2 years after age 72 if diagnosed with risk of osteoporosis or estrogen deficiency. Covered yearly for long-term glucocorticoid medication use (Steroids) Last Dexa Scan:    XR DEXA BONE DENSITOMETRY (CPT=77080) 10/03/2022      No recommendations at this time   Pap and Pelvic    Pap   Covered every 2 years for women at normal risk;  Annually if at high risk -  No recommendations at this time    Chlamydia Annually if high risk -  No recommendations at this time   Screening Mammogram    Mammogram     Recommend annually for all female patients aged 36 and older    One baseline mammogram covered for patients aged 32-38 08/10/2022    Mammogram due on 08/10/2023    Immunizations    Influenza Covered once per flu season  Please get every year -  No recommendations at this time    Pneumococcal Each vaccine ([id number] & [id number]) covered once after 65 Prevnar 13: 09/29/2020    [id number]: 06/15/2022     No recommendations at this time    Hepatitis B One screening covered for patients with certain risk factors   -  No recommendations at this time    Tetanus Toxoid Not covered by Medicare Part B unless medically necessary (cut with metal); may be covered with your pharmacy prescription benefits -    Tetanus, Diptheria and Pertusis TD and TDaP Not covered by Medicare Part B -  No recommendations at this time    Zoster Not covered by Medicare Part B; may be covered with your pharmacy  prescription benefits 11/13/2015  Zoster Vaccines(1 of 2) due on 01/08/2016

## 2023-05-12 ENCOUNTER — LAB ENCOUNTER (OUTPATIENT)
Dept: LAB | Age: 68
End: 2023-05-12
Attending: FAMILY MEDICINE
Payer: MEDICARE

## 2023-05-12 DIAGNOSIS — E55.9 VITAMIN D DEFICIENCY: ICD-10-CM

## 2023-05-12 DIAGNOSIS — E78.00 HYPERCHOLESTEREMIA: ICD-10-CM

## 2023-05-12 DIAGNOSIS — Z78.9 VEGETARIAN DIET: ICD-10-CM

## 2023-05-12 LAB
ALBUMIN SERPL-MCNC: 3.7 G/DL (ref 3.4–5)
ALBUMIN/GLOB SERPL: 1.1 {RATIO} (ref 1–2)
ALP LIVER SERPL-CCNC: 66 U/L
ALT SERPL-CCNC: 15 U/L
ANION GAP SERPL CALC-SCNC: 3 MMOL/L (ref 0–18)
AST SERPL-CCNC: 30 U/L (ref 15–37)
BASOPHILS # BLD AUTO: 0.02 X10(3) UL (ref 0–0.2)
BASOPHILS NFR BLD AUTO: 0.3 %
BILIRUB SERPL-MCNC: 0.6 MG/DL (ref 0.1–2)
BILIRUB UR QL STRIP.AUTO: NEGATIVE
BUN BLD-MCNC: 16 MG/DL (ref 7–18)
CALCIUM BLD-MCNC: 9.4 MG/DL (ref 8.5–10.1)
CHLORIDE SERPL-SCNC: 109 MMOL/L (ref 98–112)
CHOLEST SERPL-MCNC: 204 MG/DL (ref ?–200)
CO2 SERPL-SCNC: 27 MMOL/L (ref 21–32)
COLOR UR AUTO: YELLOW
CREAT BLD-MCNC: 0.77 MG/DL
EOSINOPHIL # BLD AUTO: 0.3 X10(3) UL (ref 0–0.7)
EOSINOPHIL NFR BLD AUTO: 5 %
ERYTHROCYTE [DISTWIDTH] IN BLOOD BY AUTOMATED COUNT: 11.8 %
FASTING PATIENT LIPID ANSWER: YES
FASTING STATUS PATIENT QL REPORTED: YES
GFR SERPLBLD BASED ON 1.73 SQ M-ARVRAT: 84 ML/MIN/1.73M2 (ref 60–?)
GLOBULIN PLAS-MCNC: 3.5 G/DL (ref 2.8–4.4)
GLUCOSE BLD-MCNC: 88 MG/DL (ref 70–99)
GLUCOSE UR STRIP.AUTO-MCNC: NEGATIVE MG/DL
HCT VFR BLD AUTO: 40.8 %
HDLC SERPL-MCNC: 74 MG/DL (ref 40–59)
HGB BLD-MCNC: 12.9 G/DL
IMM GRANULOCYTES # BLD AUTO: 0.03 X10(3) UL (ref 0–1)
IMM GRANULOCYTES NFR BLD: 0.5 %
KETONES UR STRIP.AUTO-MCNC: NEGATIVE MG/DL
LDLC SERPL CALC-MCNC: 110 MG/DL (ref ?–100)
LEUKOCYTE ESTERASE UR QL STRIP.AUTO: NEGATIVE
LYMPHOCYTES # BLD AUTO: 1.74 X10(3) UL (ref 1–4)
LYMPHOCYTES NFR BLD AUTO: 28.7 %
MCH RBC QN AUTO: 29.2 PG (ref 26–34)
MCHC RBC AUTO-ENTMCNC: 31.6 G/DL (ref 31–37)
MCV RBC AUTO: 92.3 FL
MONOCYTES # BLD AUTO: 0.51 X10(3) UL (ref 0.1–1)
MONOCYTES NFR BLD AUTO: 8.4 %
NEUTROPHILS # BLD AUTO: 3.46 X10 (3) UL (ref 1.5–7.7)
NEUTROPHILS # BLD AUTO: 3.46 X10(3) UL (ref 1.5–7.7)
NEUTROPHILS NFR BLD AUTO: 57.1 %
NITRITE UR QL STRIP.AUTO: NEGATIVE
NONHDLC SERPL-MCNC: 130 MG/DL (ref ?–130)
OSMOLALITY SERPL CALC.SUM OF ELEC: 289 MOSM/KG (ref 275–295)
PH UR STRIP.AUTO: 7 [PH] (ref 5–8)
PLATELET # BLD AUTO: 375 10(3)UL (ref 150–450)
POTASSIUM SERPL-SCNC: 4.2 MMOL/L (ref 3.5–5.1)
PROT SERPL-MCNC: 7.2 G/DL (ref 6.4–8.2)
PROT UR STRIP.AUTO-MCNC: NEGATIVE MG/DL
RBC # BLD AUTO: 4.42 X10(6)UL
RBC UR QL AUTO: NEGATIVE
SODIUM SERPL-SCNC: 139 MMOL/L (ref 136–145)
SP GR UR STRIP.AUTO: 1.02 (ref 1–1.03)
TRIGL SERPL-MCNC: 112 MG/DL (ref 30–149)
TSI SER-ACNC: 1.82 MIU/ML (ref 0.36–3.74)
UROBILINOGEN UR STRIP.AUTO-MCNC: <2 MG/DL
VIT B12 SERPL-MCNC: 493 PG/ML (ref 193–986)
VIT D+METAB SERPL-MCNC: 36 NG/ML (ref 30–100)
VLDLC SERPL CALC-MCNC: 19 MG/DL (ref 0–30)
WBC # BLD AUTO: 6.1 X10(3) UL (ref 4–11)

## 2023-05-12 PROCEDURE — 80053 COMPREHEN METABOLIC PANEL: CPT

## 2023-05-12 PROCEDURE — 84443 ASSAY THYROID STIM HORMONE: CPT

## 2023-05-12 PROCEDURE — 86480 TB TEST CELL IMMUN MEASURE: CPT

## 2023-05-12 PROCEDURE — 82306 VITAMIN D 25 HYDROXY: CPT

## 2023-05-12 PROCEDURE — 81001 URINALYSIS AUTO W/SCOPE: CPT

## 2023-05-12 PROCEDURE — 85025 COMPLETE CBC W/AUTO DIFF WBC: CPT

## 2023-05-12 PROCEDURE — 80061 LIPID PANEL: CPT

## 2023-05-12 PROCEDURE — 82607 VITAMIN B-12: CPT

## 2023-05-15 LAB
M TB IFN-G CD4+ T-CELLS BLD-ACNC: 0.02 IU/ML
M TB TUBERC IFN-G BLD QL: NEGATIVE
M TB TUBERC IGNF/MITOGEN IGNF CONTROL: >10 IU/ML
QFT TB1 AG MINUS NIL: 0.09 IU/ML
QFT TB2 AG MINUS NIL: 0.06 IU/ML

## 2023-05-16 ENCOUNTER — TELEPHONE (OUTPATIENT)
Dept: FAMILY MEDICINE CLINIC | Facility: CLINIC | Age: 68
End: 2023-05-16

## 2023-05-18 ENCOUNTER — MED REC SCAN ONLY (OUTPATIENT)
Dept: FAMILY MEDICINE CLINIC | Facility: CLINIC | Age: 68
End: 2023-05-18

## 2023-06-01 NOTE — TELEPHONE ENCOUNTER
SERTRALINE 25MG TABLETS    Please see pended medications. Please sign if appropriate. Thank you      Last OV: 11/09/2022      Last refill: 06/15/2022 for 90/1 refill      [facility] sent to the pt to call and schedule her appt. 29-May-2023 17:36

## 2023-10-26 ENCOUNTER — IMMUNIZATION (OUTPATIENT)
Dept: FAMILY MEDICINE CLINIC | Facility: CLINIC | Age: 68
End: 2023-10-26

## 2023-10-26 DIAGNOSIS — Z23 NEED FOR IMMUNIZATION AGAINST INFLUENZA: Primary | ICD-10-CM

## 2023-10-26 PROCEDURE — 90662 IIV NO PRSV INCREASED AG IM: CPT

## 2023-10-26 PROCEDURE — G0008 ADMIN INFLUENZA VIRUS VAC: HCPCS

## 2023-12-05 DIAGNOSIS — F43.9 STRESS: ICD-10-CM

## 2023-12-06 NOTE — TELEPHONE ENCOUNTER
LOV: 05/10/2023  for: MA Supervisit   Patient advised to RTC on:   6 months (around 11/10/2023). Medication Quantity Refills Start End   sertraline 25 MG Oral Tab 30 tablet 5 5/10/2023    Sig:   Take 1 tablet (25 mg total) by mouth daily.

## 2023-12-07 RX ORDER — SERTRALINE HYDROCHLORIDE 25 MG/1
25 TABLET, FILM COATED ORAL DAILY
Qty: 30 TABLET | Refills: 1 | Status: SHIPPED | OUTPATIENT
Start: 2023-12-07

## 2023-12-07 NOTE — TELEPHONE ENCOUNTER
Pt called re refill request, advised due for apt. Scheduled for 1/31/24. Pt completely out of medication,requesting refill ASAP.

## 2024-01-16 DIAGNOSIS — F43.9 STRESS: ICD-10-CM

## 2024-01-17 RX ORDER — SERTRALINE HYDROCHLORIDE 25 MG/1
25 TABLET, FILM COATED ORAL DAILY
Qty: 30 TABLET | Refills: 0 | Status: SHIPPED | OUTPATIENT
Start: 2024-01-17

## 2024-01-17 NOTE — TELEPHONE ENCOUNTER
LOV: 05/10/2023  for: MA supervisit   Patient advised to RTC on:  6 months (around 11/10/2023)   Nov:01/31/2024      Medication Quantity Refills Start End   sertraline 25 MG Oral Tab 30 tablet 1 12/7/2023 --   Sig:   Take 1 tablet (25 mg total) by mouth daily.

## 2024-01-31 ENCOUNTER — OFFICE VISIT (OUTPATIENT)
Dept: FAMILY MEDICINE CLINIC | Facility: CLINIC | Age: 69
End: 2024-01-31
Payer: MEDICARE

## 2024-01-31 VITALS
SYSTOLIC BLOOD PRESSURE: 100 MMHG | TEMPERATURE: 98 F | RESPIRATION RATE: 16 BRPM | HEART RATE: 66 BPM | WEIGHT: 113 LBS | HEIGHT: 62 IN | DIASTOLIC BLOOD PRESSURE: 60 MMHG | BODY MASS INDEX: 20.8 KG/M2

## 2024-01-31 DIAGNOSIS — C50.212 MALIGNANT NEOPLASM OF UPPER-INNER QUADRANT OF LEFT BREAST IN FEMALE, ESTROGEN RECEPTOR POSITIVE  (HCC): ICD-10-CM

## 2024-01-31 DIAGNOSIS — E78.00 HYPERCHOLESTEREMIA: ICD-10-CM

## 2024-01-31 DIAGNOSIS — I73.00 RAYNAUD'S DISEASE WITHOUT GANGRENE: ICD-10-CM

## 2024-01-31 DIAGNOSIS — Z00.00 ENCOUNTER FOR ANNUAL HEALTH EXAMINATION: Primary | ICD-10-CM

## 2024-01-31 DIAGNOSIS — Z17.0 MALIGNANT NEOPLASM OF UPPER-INNER QUADRANT OF LEFT BREAST IN FEMALE, ESTROGEN RECEPTOR POSITIVE  (HCC): ICD-10-CM

## 2024-01-31 DIAGNOSIS — Z12.31 SCREENING MAMMOGRAM FOR BREAST CANCER: ICD-10-CM

## 2024-01-31 DIAGNOSIS — Z78.9 VEGETARIAN DIET: ICD-10-CM

## 2024-01-31 DIAGNOSIS — Z78.0 POSTMENOPAUSAL: ICD-10-CM

## 2024-01-31 DIAGNOSIS — F43.9 STRESS: ICD-10-CM

## 2024-01-31 DIAGNOSIS — M81.0 AGE-RELATED OSTEOPOROSIS WITHOUT CURRENT PATHOLOGICAL FRACTURE: ICD-10-CM

## 2024-01-31 DIAGNOSIS — E55.9 VITAMIN D DEFICIENCY: ICD-10-CM

## 2024-01-31 PROCEDURE — 3008F BODY MASS INDEX DOCD: CPT | Performed by: FAMILY MEDICINE

## 2024-01-31 PROCEDURE — 99214 OFFICE O/P EST MOD 30 MIN: CPT | Performed by: FAMILY MEDICINE

## 2024-01-31 PROCEDURE — 3078F DIAST BP <80 MM HG: CPT | Performed by: FAMILY MEDICINE

## 2024-01-31 PROCEDURE — 1159F MED LIST DOCD IN RCRD: CPT | Performed by: FAMILY MEDICINE

## 2024-01-31 PROCEDURE — 96160 PT-FOCUSED HLTH RISK ASSMT: CPT | Performed by: FAMILY MEDICINE

## 2024-01-31 PROCEDURE — 1160F RVW MEDS BY RX/DR IN RCRD: CPT | Performed by: FAMILY MEDICINE

## 2024-01-31 PROCEDURE — 3074F SYST BP LT 130 MM HG: CPT | Performed by: FAMILY MEDICINE

## 2024-01-31 PROCEDURE — 1126F AMNT PAIN NOTED NONE PRSNT: CPT | Performed by: FAMILY MEDICINE

## 2024-01-31 PROCEDURE — G0439 PPPS, SUBSEQ VISIT: HCPCS | Performed by: FAMILY MEDICINE

## 2024-01-31 RX ORDER — ALENDRONATE SODIUM 70 MG/1
70 TABLET ORAL WEEKLY
Qty: 12 TABLET | Refills: 1 | Status: SHIPPED | OUTPATIENT
Start: 2024-01-31

## 2024-01-31 RX ORDER — SERTRALINE HYDROCHLORIDE 25 MG/1
25 TABLET, FILM COATED ORAL DAILY
Qty: 90 TABLET | Refills: 1 | Status: SHIPPED | OUTPATIENT
Start: 2024-01-31

## 2024-01-31 NOTE — PATIENT INSTRUCTIONS
Tdap tetanus shot  Shingrix shingles vaccination.  RSV shot.   Continue current meds.   Watch diet for fats and carbs.   Stay active.    Call [phone] to schedule Dexa scan.    Stephani Suggs's SCREENING SCHEDULE   Tests on this list are recommended by your physician but may not be covered, or covered at this frequency, by your insurer.   Please check with your insurance carrier before scheduling to verify coverage.   PREVENTATIVE SERVICES FREQUENCY &  COVERAGE DETAILS LAST COMPLETION DATE   Diabetes Screening    Fasting Blood Sugar /  Glucose    One screening every 12 months if never tested or if previously tested but not diagnosed with pre-diabetes   One screening every 6 months if diagnosed with pre-diabetes Lab Results   Component Value Date    GLU 88 05/12/2023        Cardiovascular Disease Screening    Lipid Panel  Cholesterol  Lipoprotein (HDL)  Triglycerides Covered every 5 years for all Medicare beneficiaries without apparent signs or symptoms of cardiovascular disease Lab Results   Component Value Date    CHOLEST 204 (H) 05/12/2023    HDL 74 (H) 05/12/2023     (H) 05/12/2023    TRIG 112 05/12/2023         Electrocardiogram (EKG)   Covered if needed at Welcome to Medicare, and non-screening if indicated for medical reasons 12/02/2014      Ultrasound Screening for Abdominal Aortic Aneurysm (AAA) Covered once in a lifetime for one of the following risk factors    Men who are 65-75 years old and have ever smoked    Anyone with a family history -     Colorectal Cancer Screening  Covered for ages 50-85; only need ONE of the following:    Colonoscopy   Covered every 10 years    Covered every 2 years if patient is at high risk or previous colonoscopy was abnormal 01/18/2021    Health Maintenance   Topic Date Due    Colorectal Cancer Screening  01/18/2031       Flexible Sigmoidoscopy   Covered every 4 years -    Fecal Occult Blood Test Covered annually -   Bone Density Screening    Bone density  screening    Covered every 2 years after age 65 if diagnosed with risk of osteoporosis or estrogen deficiency.    Covered yearly for long-term glucocorticoid medication use (Steroids) Last Dexa Scan:    XR DEXA BONE DENSITOMETRY (CPT=77080) 10/03/2022      No recommendations at this time   Pap and Pelvic    Pap   Covered every 2 years for women at normal risk; Annually if at high risk 02/18/2020  No recommendations at this time    Chlamydia Annually if high risk -  No recommendations at this time   Screening Mammogram    Mammogram     Recommend annually for all female patients aged 40 and older    One baseline mammogram covered for patients aged 35-39 08/10/2022    Health Maintenance   Topic Date Due    Mammogram  08/10/2023       Immunizations    Influenza Covered once per flu season  Please get every year 10/26/2023  No recommendations at this time    Pneumococcal Each vaccine ([id number] & [id number]) covered once after 65 Prevnar 13: 09/29/2020    Hxaqpvmel49: 06/15/2022     No recommendations at this time    Hepatitis B One screening covered for patients with certain risk factors   -  No recommendations at this time    Tetanus Toxoid Not covered by Medicare Part B unless medically necessary (cut with metal); may be covered with your pharmacy prescription benefits -    Tetanus, Diptheria and Pertusis TD and TDaP Not covered by Medicare Part B -  No recommendations at this time    Zoster Not covered by Medicare Part B; may be covered with your pharmacy  prescription benefits 11/13/2015  Zoster Vaccines(1 of 2) due on 01/08/2016

## 2024-01-31 NOTE — PROGRESS NOTES
Subjective:   Stephani Suggs is a 68 year old female who presents for a MA (Medicare Advantage) Supervisit (Once per calendar year) and scheduled follow up of multiple significant but stable problems and Complete work form, osteoporosis, PMS/stress, breast cancer .     Patient had elevated cholesterol numbers on the blood work in the past we will check cholesterol levels she is watching diet monitor.     Vegetarian diet we will check vitamin B12 level will monitor.    Vitamin D deficiency check vitamin D level replace if needed,    Left breast cancer patient is monitored closely by hematologist.  Monitored by them.  Patient needs to schedule appointment with hematologist to get her mammogram.    Osteoporosis patient is taking Fosamax.  Doing well with medication.  Monitor DEXA scan bone density scan was ordered last year never completed.  Medication refill was called continue also  Calcium vitamin D supplementation    PMS/stress patient is taking sertraline medications helping.  We will continue medication.  Patient lost her  due to lung cancer.  She said that she is okay she has a brother in the area.  patient  brother is supportive.  Son is out of state..    Patient started a job at [employer].  Had some problems with cold hands at her work.  She is working as a  and customer service.  Would like a note stating that she has Raynaud's syndrome.      History/Other:   Fall Risk Assessment:   She has been screened for Falls and is low risk.      Cognitive Assessment:   She had a completely normal cognitive assessment - see flowsheet entries     Functional Ability/Status:   Stephani Suggs has a completely normal functional assessment. See flowsheet for details.          Depression Screening (PHQ-2/PHQ-9): PHQ-2 SCORE: 0  , done 1/31/2024   If you checked off any problems, how difficult have these problems made it for you to do your work, take care of things at home, or get along with other people?: Not  difficult at all        Advanced Directives:   She does NOT have a Living Will. [Do you have a living will?: No]  She does NOT have a Power of  for Health Care. [Do you have a healthcare power of ?: No]  Discussed Advance Care Planning with patient (and family/surrogate if present). Standard forms made available to patient in After Visit Summary.      Patient Active Problem List   Diagnosis   • Hematuria, unspecified   • Anemia, unspecified   • Pleurisy   • Mitral valve disorder   • Raynaud's syndrome   • Osteoporosis   • Malignant neoplasm of upper-inner quadrant of left female breast (HCC)   • Elevated cholesterol   • Trigger finger of right thumb   • Hypercholesteremia   • Stress   • Special screening for malignant neoplasm of colon   • Acute pain of right shoulder     Allergies:  She has No Known Allergies.    Current Medications:  Outpatient Medications Marked as Taking for the 1/31/24 encounter (Office Visit) with Brittnee Gasca MD   Medication Sig   • sertraline 25 MG Oral Tab Take 1 tablet (25 mg total) by mouth daily.   • alendronate 70 MG Oral Tab Take 1 tablet (70 mg total) by mouth once a week.   • Calcium Carbonate-Vitamin D (CALCIUM + D OR) Take 1 tablet by mouth 2 (two) times daily.   • Multiple Vitamins-Minerals (CENTRUM SILVER ULTRA WOMENS) Oral Tab None Entered       Medical History:  She  has a past medical history of Breast CA (HCC), Cancer of breast (HCC) (2011), History of bone density study, History of mammogram (10/04/10), Osteoporosis, Routine gynecological examination, and Wears glasses.  Surgical History:  She  has a past surgical history that includes other surgical history (2009); thyroidectomy (2002); biopsy of breast, needle core (11/16/2009); cataract (2005 & 2006); other surgical history (1999); juvencio biopsy stereo nodule 2 site bilat (cpt=19081/04236); radiation left; Eye surgery (8/2013); and lumpectomy left (11/21/2011).   Family History:  Her family history  includes Breast Cancer (age of onset: 55) in her self; Lipids in her son; Other in her father; hyperlipidemia in her mother; parkinson's in her mother.  Social History:  She  reports that she has never smoked. She has never used smokeless tobacco. She reports current alcohol use. She reports that she does not use drugs.    Tobacco:  She has never smoked tobacco.    CAGE Alcohol Screen:   CAGE screening score of 0 on 1/31/2024, showing low risk of alcohol abuse.      Patient Care Team:  Brittnee Gasca MD as PCP - General (Family Medicine)  Cornelia Jamison MD (Radiation Oncology)  Federico Mosher MD (GASTROENTEROLOGY)  Rohan Zhao DO as Consulting Physician (NEUROLOGY)    Review of Systems  GENERAL: feels well otherwise  SKIN: denies any unusual skin lesions  EYES: denies blurred vision or double vision  HEENT: denies nasal congestion, sinus pain or ST  LUNGS: denies shortness of breath with exertion  CARDIOVASCULAR: denies chest pain on exertion  GI: denies abdominal pain, denies heartburn  : denies dysuria, vaginal discharge or itching, no complaint of urinary incontinence   MUSCULOSKELETAL: denies back pain, some problems with rainout syndrome symptoms due to water  NEURO: denies headaches  PSYCHE: denies depression or anxiety  HEMATOLOGIC: denies hx of anemia  ENDOCRINE: denies thyroid history  ALL/ASTHMA: denies hx of allergy or asthma    Objective:   Physical Exam  General Appearance:  Alert, cooperative, no distress, appears stated age   Head:  Normocephalic, without obvious abnormality, atraumatic   Eyes:  PERRL, conjunctiva/corneas clear, EOM's intact both eyes   Ears:  Normal TM's and external ear canals, both ears   Nose: Nares normal, septum midline,mucosa normal, no drainage or sinus tenderness   Throat: Lips, mucosa, and tongue normal; teeth and gums normal   Neck: Supple, symmetrical, trachea midline, no adenopathy;  thyroid: not enlarged, symmetric, no tenderness/mass/nodules;  no carotid bruit or JVD   Back:   Symmetric, no curvature, ROM normal, no CVA tenderness   Lungs:   Clear to auscultation bilaterally, respirations unlabored   Heart:  Regular rate and rhythm, S1 and S2 normal, no murmur, rub, or gallop   Abdomen:   Soft, non-tender, bowel sounds active all four quadrants,  no masses, no organomegaly   Pelvic: Deferred  Breast deferred ,   Extremities: Extremities normal, atraumatic, no cyanosis or edema   Pulses: 2+ and symmetric   Skin: Skin color, texture, turgor normal, no rashes or lesions   Lymph nodes: Cervical, supraclavicular, and axillary nodes normal   Neurologic: Normal       /60 (BP Location: Right arm, Patient Position: Sitting, Cuff Size: adult)   Pulse 66   Temp 97.9 °F (36.6 °C) (Temporal)   Resp 16   Ht 5' 2\" (1.575 m)   Wt 113 lb (51.3 kg)   BMI 20.67 kg/m²  Estimated body mass index is 20.67 kg/m² as calculated from the following:    Height as of this encounter: 5' 2\" (1.575 m).    Weight as of this encounter: 113 lb (51.3 kg).    Medicare Hearing Assessment:   Hearing Screening    Time taken: 1/31/2024  2:31 PM  Screening Method: Finger Rub             Visual Acuity:   Right Eye Visual Acuity: Corrected Right Eye Chart Acuity: 20/20   Left Eye Visual Acuity: Corrected Left Eye Chart Acuity: 20/20   Both Eyes Visual Acuity: Corrected Both Eyes Chart Acuity: 20/20   Able To Tolerate Visual Acuity: Yes        Assessment & Plan:   Stephani Suggs is a 68 year old female who presents for a Medicare Assessment.     1. Encounter for annual health examination (Primary)  2. Hypercholesteremia  -     CBC With Differential With Platelet; Future; Expected date: 01/31/2024  -     Comp Metabolic Panel (14); Future; Expected date: 01/31/2024  -     Lipid Panel; Future; Expected date: 01/31/2024  -     TSH W Reflex To Free T4; Future; Expected date: 01/31/2024  -     Urinalysis with Culture Reflex; Future; Expected date: 01/31/2024  3. Screening mammogram for  breast cancer  4. Stress  -     Sertraline HCl; Take 1 tablet (25 mg total) by mouth daily.  Dispense: 90 tablet; Refill: 1  5. Age-related osteoporosis without current pathological fracture  -     Alendronate Sodium; Take 1 tablet (70 mg total) by mouth once a week.  Dispense: 12 tablet; Refill: 1  6. Postmenopausal  7. Vitamin D deficiency  -     Vitamin D; Future; Expected date: 01/31/2024  8. Vegetarian diet  -     Vitamin B12; Future; Expected date: 01/31/2024  9. Malignant neoplasm of upper-inner quadrant of left breast in female, estrogen receptor positive  (HCC)  -     Oncology/Hematology Referral - [facility] ([facility])  10. Raynaud's disease without gangrene    Hypercholesterolemia monitor blood work.  If cholesterol numbers is elevated we will consider doing heart scan    Vegetarian diet check vitamin B12 level monitor    Breast cancer screening -mammogram per Dr. Mosher    Vitamin D deficiency continue taking vitamin D supplement monitor    Left breast cancer seeing specialist continue present management    Osteoporosis check DEXA scan patient will call insurance to make sure that it is covered stay on  Fosamax.  Continue calcium and vitamin D supplementation    Raynaud's monitor.    Stress continue sertraline stable.   Tdap tetanus shot  Shingrix shingles vaccination.  RSV shot.   Continue current meds.   Watch diet for fats and carbs.   Stay active.    Call [phone] to schedule Dexa scan.    The patient indicates understanding of these issues and agrees to the plan.  Imaging studies ordered.  Lab work ordered.  Reinforced healthy diet, lifestyle, and exercise.      Return in about 6 months (around 7/31/2024).     Brittnee Gasca MD, 5/10/2023     Supplementary Documentation:   General Health:  In the past six months, have you lost more than 10 pounds without trying?: 2 - No  Has your appetite been poor?: No  Type of Diet: Vegetarian  How does the patient maintain a good energy level?: Daily  Walks  How would you describe your daily physical activity?: Moderate  How would you describe your current health state?: Good  How do you maintain positive mental well-being?: Visiting Friends;Visiting Family  On a scale of 0 to 10, with 0 being no pain and 10 being severe pain, what is your pain level?: 0 - (None)  In the past six months, have you experienced urine leakage?: 0-No  At any time do you feel concerned for the safety/well-being of yourself and/or your children, in your home or elsewhere?: No  Have you had any immunizations at another office such as Influenza, Hepatitis B, Tetanus, or Pneumococcal?: No       Stephani Suggs's SCREENING SCHEDULE   Tests on this list are recommended by your physician but may not be covered, or covered at this frequency, by your insurer.   Please check with your insurance carrier before scheduling to verify coverage.   PREVENTATIVE SERVICES FREQUENCY &  COVERAGE DETAILS LAST COMPLETION DATE   Diabetes Screening    Fasting Blood Sugar /  Glucose    One screening every 12 months if never tested or if previously tested but not diagnosed with pre-diabetes   One screening every 6 months if diagnosed with pre-diabetes Lab Results   Component Value Date    GLU 88 05/12/2023        Cardiovascular Disease Screening    Lipid Panel  Cholesterol  Lipoprotein (HDL)  Triglycerides Covered every 5 years for all Medicare beneficiaries without apparent signs or symptoms of cardiovascular disease Lab Results   Component Value Date    CHOLEST 204 (H) 05/12/2023    HDL 74 (H) 05/12/2023     (H) 05/12/2023    TRIG 112 05/12/2023         Electrocardiogram (EKG)   Covered if needed at Welcome to Medicare, and non-screening if indicated for medical reasons 12/02/2014      Ultrasound Screening for Abdominal Aortic Aneurysm (AAA) Covered once in a lifetime for one of the following risk factors   • Men who are 65-75 years old and have ever smoked   • Anyone with a family history -      Colorectal Cancer Screening  Covered for ages 50-85; only need ONE of the following:    Colonoscopy   Covered every 10 years    Covered every 2 years if patient is at high risk or previous colonoscopy was abnormal 01/18/2021    Health Maintenance   Topic Date Due   • Colorectal Cancer Screening  01/18/2031       Flexible Sigmoidoscopy   Covered every 4 years -    Fecal Occult Blood Test Covered annually -   Bone Density Screening    Bone density screening    Covered every 2 years after age 65 if diagnosed with risk of osteoporosis or estrogen deficiency.    Covered yearly for long-term glucocorticoid medication use (Steroids) Last Dexa Scan:    XR DEXA BONE DENSITOMETRY (CPT=77080) 10/03/2022      No recommendations at this time   Pap and Pelvic    Pap   Covered every 2 years for women at normal risk; Annually if at high risk 02/18/2020  No recommendations at this time    Chlamydia Annually if high risk -  No recommendations at this time   Screening Mammogram    Mammogram     Recommend annually for all female patients aged 40 and older    One baseline mammogram covered for patients aged 35-39 08/10/2022    Health Maintenance   Topic Date Due   • Mammogram  08/10/2023       Immunizations    Influenza Covered once per flu season  Please get every year 10/26/2023  No recommendations at this time    Pneumococcal Each vaccine ([id number] & [id number]) covered once after 65 Prevnar 13: 09/29/2020    [id number]: 06/15/2022     No recommendations at this time    Hepatitis B One screening covered for patients with certain risk factors   -  No recommendations at this time    Tetanus Toxoid Not covered by Medicare Part B unless medically necessary (cut with metal); may be covered with your pharmacy prescription benefits -    Tetanus, Diptheria and Pertusis TD and TDaP Not covered by Medicare Part B -  No recommendations at this time    Zoster Not covered by Medicare Part B; may be covered with your pharmacy  prescription  benefits 11/13/2015  Zoster Vaccines(1 of 2) due on 01/08/2016

## 2024-01-31 NOTE — PROGRESS NOTES
Subjective:   Stephani Suggs is a 68 year old female who presents for a MA (Medicare Advantage) Supervisit (Once per calendar year) and scheduled follow up of multiple significant but stable problems and Complete work form, osteoporosis, PMS/stress, breast cancer .     Patient had elevated cholesterol numbers on the blood work in the past we will check cholesterol levels she is watching diet monitor.     Vegetarian diet we will check vitamin B12 level will monitor.    Vitamin D deficiency check vitamin D level replace if needed,    Left breast cancer patient is monitored closely by hematologist.  Monitored by them.  Patient needs to schedule appointment with hematologist to get her mammogram.    Osteoporosis patient is taking Fosamax.  Doing well with medication.  Monitor DEXA scan bone density scan was ordered last year never completed.  Medication refill was called continue also  Calcium vitamin D supplementation    PMS/stress patient is taking sertraline medications helping.  We will continue medication.  Patient lost her  due to lung cancer.  She said that she is okay she has a brother in the area.  patient  brother is supportive.  Son is out of state..  Phq-9  Score 0, ELOY-7 score 0.  Patient started a job at [employer].  Had some problems with cold hands at her work.  She is working as a  and customer service.  Would like a note stating that she has Raynaud's syndrome.      History/Other:   Fall Risk Assessment:   She has been screened for Falls and is low risk.      Cognitive Assessment:   She had a completely normal cognitive assessment - see flowsheet entries     Functional Ability/Status:   Stephani Suggs has a completely normal functional assessment. See flowsheet for details.          Depression Screening (PHQ-2/PHQ-9): PHQ-2 SCORE: 0  , done 1/31/2024   If you checked off any problems, how difficult have these problems made it for you to do your work, take care of things at home, or get  along with other people?: Not difficult at all        Advanced Directives:   She does NOT have a Living Will. [Do you have a living will?: No]  She does NOT have a Power of  for Health Care. [Do you have a healthcare power of ?: No]  Discussed Advance Care Planning with patient (and family/surrogate if present). Standard forms made available to patient in After Visit Summary.      Patient Active Problem List   Diagnosis   • Hematuria, unspecified   • Anemia, unspecified   • Pleurisy   • Mitral valve disorder   • Raynaud's syndrome   • Osteoporosis   • Malignant neoplasm of upper-inner quadrant of left female breast (HCC)   • Elevated cholesterol   • Trigger finger of right thumb   • Hypercholesteremia   • Stress   • Special screening for malignant neoplasm of colon   • Acute pain of right shoulder     Allergies:  She has No Known Allergies.    Current Medications:  Outpatient Medications Marked as Taking for the 1/31/24 encounter (Office Visit) with Brittnee Gasca MD   Medication Sig   • sertraline 25 MG Oral Tab Take 1 tablet (25 mg total) by mouth daily.   • alendronate 70 MG Oral Tab Take 1 tablet (70 mg total) by mouth once a week.   • Calcium Carbonate-Vitamin D (CALCIUM + D OR) Take 1 tablet by mouth 2 (two) times daily.   • Multiple Vitamins-Minerals (CENTRUM SILVER ULTRA WOMENS) Oral Tab None Entered       Medical History:  She  has a past medical history of Breast CA (HCC), Cancer of breast (HCC) (2011), History of bone density study, History of mammogram (10/04/10), Osteoporosis, Routine gynecological examination, and Wears glasses.  Surgical History:  She  has a past surgical history that includes other surgical history (2009); thyroidectomy (2002); biopsy of breast, needle core (11/16/2009); cataract (2005 & 2006); other surgical history (1999); juvencio biopsy stereo nodule 2 site bilat (cpt=19081/24084); radiation left; Eye surgery (8/2013); and lumpectomy left (11/21/2011).   Family  History:  Her family history includes Breast Cancer (age of onset: 55) in her self; Lipids in her son; Other in her father; hyperlipidemia in her mother; parkinson's in her mother.  Social History:  She  reports that she has never smoked. She has never used smokeless tobacco. She reports current alcohol use. She reports that she does not use drugs.    Tobacco:  She has never smoked tobacco.    CAGE Alcohol Screen:   CAGE screening score of 0 on 1/31/2024, showing low risk of alcohol abuse.      Patient Care Team:  Brittnee Gasca MD as PCP - General (Family Medicine)  Cornelia Jamison MD (Radiation Oncology)  Federico Mosher MD (GASTROENTEROLOGY)  Rohan Zhao DO as Consulting Physician (NEUROLOGY)    Review of Systems  GENERAL: feels well otherwise  SKIN: denies any unusual skin lesions  EYES: denies blurred vision or double vision  HEENT: denies nasal congestion, sinus pain or ST  LUNGS: denies shortness of breath with exertion  CARDIOVASCULAR: denies chest pain on exertion  GI: denies abdominal pain, denies heartburn  : denies dysuria, vaginal discharge or itching, no complaint of urinary incontinence   MUSCULOSKELETAL: denies back pain, some problems with rainout syndrome symptoms due to water  NEURO: denies headaches  PSYCHE: denies depression or anxiety  HEMATOLOGIC: denies hx of anemia  ENDOCRINE: denies thyroid history  ALL/ASTHMA: denies hx of allergy or asthma    Objective:   Physical Exam  General Appearance:  Alert, cooperative, no distress, appears stated age   Head:  Normocephalic, without obvious abnormality, atraumatic   Eyes:  PERRL, conjunctiva/corneas clear, EOM's intact both eyes   Ears:  Normal TM's and external ear canals, both ears   Nose: Nares normal, septum midline,mucosa normal, no drainage or sinus tenderness   Throat: Lips, mucosa, and tongue normal; teeth and gums normal   Neck: Supple, symmetrical, trachea midline, no adenopathy;  thyroid: not enlarged,  symmetric, no tenderness/mass/nodules; no carotid bruit or JVD   Back:   Symmetric, no curvature, ROM normal, no CVA tenderness   Lungs:   Clear to auscultation bilaterally, respirations unlabored   Heart:  Regular rate and rhythm, S1 and S2 normal, no murmur, rub, or gallop   Abdomen:   Soft, non-tender, bowel sounds active all four quadrants,  no masses, no organomegaly   Pelvic: Deferred  Breast deferred ,   Extremities: Extremities normal, atraumatic, no cyanosis or edema   Pulses: 2+ and symmetric   Skin: Skin color, texture, turgor normal, no rashes or lesions   Lymph nodes: Cervical, supraclavicular, and axillary nodes normal   Neurologic: Normal       /60 (BP Location: Right arm, Patient Position: Sitting, Cuff Size: adult)   Pulse 66   Temp 97.9 °F (36.6 °C) (Temporal)   Resp 16   Ht 5' 2\" (1.575 m)   Wt 113 lb (51.3 kg)   BMI 20.67 kg/m²  Estimated body mass index is 20.67 kg/m² as calculated from the following:    Height as of this encounter: 5' 2\" (1.575 m).    Weight as of this encounter: 113 lb (51.3 kg).    Medicare Hearing Assessment:   Hearing Screening    Time taken: 1/31/2024  2:31 PM  Screening Method: Finger Rub             Visual Acuity:   Right Eye Visual Acuity: Corrected Right Eye Chart Acuity: 20/20   Left Eye Visual Acuity: Corrected Left Eye Chart Acuity: 20/20   Both Eyes Visual Acuity: Corrected Both Eyes Chart Acuity: 20/20   Able To Tolerate Visual Acuity: Yes        Assessment & Plan:   Stephani Suggs is a 68 year old female who presents for a Medicare Assessment.     1. Encounter for annual health examination (Primary)  2. Hypercholesteremia  -     CBC With Differential With Platelet; Future; Expected date: 01/31/2024  -     Comp Metabolic Panel (14); Future; Expected date: 01/31/2024  -     Lipid Panel; Future; Expected date: 01/31/2024  -     TSH W Reflex To Free T4; Future; Expected date: 01/31/2024  -     Urinalysis with Culture Reflex; Future; Expected date:  01/31/2024  3. Screening mammogram for breast cancer  4. Stress  -     Sertraline HCl; Take 1 tablet (25 mg total) by mouth daily.  Dispense: 90 tablet; Refill: 1  5. Age-related osteoporosis without current pathological fracture  -     Alendronate Sodium; Take 1 tablet (70 mg total) by mouth once a week.  Dispense: 12 tablet; Refill: 1  6. Postmenopausal  7. Vitamin D deficiency  -     Vitamin D; Future; Expected date: 01/31/2024  8. Vegetarian diet  -     Vitamin B12; Future; Expected date: 01/31/2024  9. Malignant neoplasm of upper-inner quadrant of left breast in female, estrogen receptor positive  (HCC)  -     Oncology/Hematology Referral - Gentry Alcazar)  10. Raynaud's disease without gangrene    Hypercholesterolemia monitor blood work.  If cholesterol numbers is elevated we will consider doing heart scan    Vegetarian diet check vitamin B12 level monitor    Breast cancer screening -mammogram per Dr. Mosher    Vitamin D deficiency continue taking vitamin D supplement monitor    Left breast cancer seeing specialist continue present management    Osteoporosis check DEXA scan patient will call insurance to make sure that it is covered stay on  Fosamax.  Continue calcium and vitamin D supplementation    Raynaud's monitor.    Stress continue sertraline stable.   Tdap tetanus shot  Shingrix shingles vaccination.  RSV shot.   Continue current meds.   Watch diet for fats and carbs.   Stay active.    Call [phone] to schedule Dexa scan.    The patient indicates understanding of these issues and agrees to the plan.  Imaging studies ordered.  Lab work ordered.  Reinforced healthy diet, lifestyle, and exercise.      Return in about 6 months (around 7/31/2024).     Brittnee Gasca MD, 5/10/2023     Supplementary Documentation:   General Health:  In the past six months, have you lost more than 10 pounds without trying?: 2 - No  Has your appetite been poor?: No  Type of Diet: Vegetarian  How does the patient  maintain a good energy level?: Daily Walks  How would you describe your daily physical activity?: Moderate  How would you describe your current health state?: Good  How do you maintain positive mental well-being?: Visiting Friends;Visiting Family  On a scale of 0 to 10, with 0 being no pain and 10 being severe pain, what is your pain level?: 0 - (None)  In the past six months, have you experienced urine leakage?: 0-No  At any time do you feel concerned for the safety/well-being of yourself and/or your children, in your home or elsewhere?: No  Have you had any immunizations at another office such as Influenza, Hepatitis B, Tetanus, or Pneumococcal?: No       Stephani Suggs's SCREENING SCHEDULE   Tests on this list are recommended by your physician but may not be covered, or covered at this frequency, by your insurer.   Please check with your insurance carrier before scheduling to verify coverage.   PREVENTATIVE SERVICES FREQUENCY &  COVERAGE DETAILS LAST COMPLETION DATE   Diabetes Screening    Fasting Blood Sugar /  Glucose    One screening every 12 months if never tested or if previously tested but not diagnosed with pre-diabetes   One screening every 6 months if diagnosed with pre-diabetes Lab Results   Component Value Date    GLU 88 05/12/2023        Cardiovascular Disease Screening    Lipid Panel  Cholesterol  Lipoprotein (HDL)  Triglycerides Covered every 5 years for all Medicare beneficiaries without apparent signs or symptoms of cardiovascular disease Lab Results   Component Value Date    CHOLEST 204 (H) 05/12/2023    HDL 74 (H) 05/12/2023     (H) 05/12/2023    TRIG 112 05/12/2023         Electrocardiogram (EKG)   Covered if needed at Welcome to Medicare, and non-screening if indicated for medical reasons 12/02/2014      Ultrasound Screening for Abdominal Aortic Aneurysm (AAA) Covered once in a lifetime for one of the following risk factors   • Men who are 65-75 years old and have ever smoked   •  Anyone with a family history -     Colorectal Cancer Screening  Covered for ages 50-85; only need ONE of the following:    Colonoscopy   Covered every 10 years    Covered every 2 years if patient is at high risk or previous colonoscopy was abnormal 01/18/2021    Health Maintenance   Topic Date Due   • Colorectal Cancer Screening  01/18/2031       Flexible Sigmoidoscopy   Covered every 4 years -    Fecal Occult Blood Test Covered annually -   Bone Density Screening    Bone density screening    Covered every 2 years after age 65 if diagnosed with risk of osteoporosis or estrogen deficiency.    Covered yearly for long-term glucocorticoid medication use (Steroids) Last Dexa Scan:    XR DEXA BONE DENSITOMETRY (CPT=77080) 10/03/2022      No recommendations at this time   Pap and Pelvic    Pap   Covered every 2 years for women at normal risk; Annually if at high risk 02/18/2020  No recommendations at this time    Chlamydia Annually if high risk -  No recommendations at this time   Screening Mammogram    Mammogram     Recommend annually for all female patients aged 40 and older    One baseline mammogram covered for patients aged 35-39 08/10/2022    Health Maintenance   Topic Date Due   • Mammogram  08/10/2023       Immunizations    Influenza Covered once per flu season  Please get every year 10/26/2023  No recommendations at this time    Pneumococcal Each vaccine ([id number] & [id number]) covered once after 65 Prevnar 13: 09/29/2020    Pvwareume05: 06/15/2022     No recommendations at this time    Hepatitis B One screening covered for patients with certain risk factors   -  No recommendations at this time    Tetanus Toxoid Not covered by Medicare Part B unless medically necessary (cut with metal); may be covered with your pharmacy prescription benefits -    Tetanus, Diptheria and Pertusis TD and TDaP Not covered by Medicare Part B -  No recommendations at this time    Zoster Not covered by Medicare Part B; may be covered  with your pharmacy  prescription benefits 11/13/2015  Zoster Vaccines(1 of 2) due on 01/08/2016

## 2024-02-07 ENCOUNTER — LAB ENCOUNTER (OUTPATIENT)
Dept: LAB | Age: 69
End: 2024-02-07
Attending: FAMILY MEDICINE
Payer: MEDICARE

## 2024-02-07 DIAGNOSIS — E55.9 VITAMIN D DEFICIENCY: ICD-10-CM

## 2024-02-07 DIAGNOSIS — Z78.9 VEGETARIAN DIET: ICD-10-CM

## 2024-02-07 DIAGNOSIS — E78.00 HYPERCHOLESTEREMIA: ICD-10-CM

## 2024-02-07 LAB
ALBUMIN SERPL-MCNC: 3.7 G/DL (ref 3.4–5)
ALBUMIN/GLOB SERPL: 1.1 {RATIO} (ref 1–2)
ALP LIVER SERPL-CCNC: 87 U/L
ALT SERPL-CCNC: 15 U/L
ANION GAP SERPL CALC-SCNC: 2 MMOL/L (ref 0–18)
AST SERPL-CCNC: 20 U/L (ref 15–37)
BASOPHILS # BLD AUTO: 0.04 X10(3) UL (ref 0–0.2)
BASOPHILS NFR BLD AUTO: 0.9 %
BILIRUB SERPL-MCNC: 0.6 MG/DL (ref 0.1–2)
BILIRUB UR QL STRIP.AUTO: NEGATIVE
BUN BLD-MCNC: 11 MG/DL (ref 9–23)
CALCIUM BLD-MCNC: 9.2 MG/DL (ref 8.5–10.1)
CHLORIDE SERPL-SCNC: 109 MMOL/L (ref 98–112)
CHOLEST SERPL-MCNC: 207 MG/DL (ref ?–200)
CLARITY UR REFRACT.AUTO: CLEAR
CO2 SERPL-SCNC: 30 MMOL/L (ref 21–32)
CREAT BLD-MCNC: 0.9 MG/DL
EGFRCR SERPLBLD CKD-EPI 2021: 70 ML/MIN/1.73M2 (ref 60–?)
EOSINOPHIL # BLD AUTO: 0.37 X10(3) UL (ref 0–0.7)
EOSINOPHIL NFR BLD AUTO: 8.3 %
ERYTHROCYTE [DISTWIDTH] IN BLOOD BY AUTOMATED COUNT: 11.6 %
FASTING PATIENT LIPID ANSWER: YES
FASTING STATUS PATIENT QL REPORTED: YES
GLOBULIN PLAS-MCNC: 3.5 G/DL (ref 2.8–4.4)
GLUCOSE BLD-MCNC: 86 MG/DL (ref 70–99)
GLUCOSE UR STRIP.AUTO-MCNC: NORMAL MG/DL
HCT VFR BLD AUTO: 41.3 %
HDLC SERPL-MCNC: 76 MG/DL (ref 40–59)
HGB BLD-MCNC: 13 G/DL
IMM GRANULOCYTES # BLD AUTO: 0.02 X10(3) UL (ref 0–1)
IMM GRANULOCYTES NFR BLD: 0.4 %
KETONES UR STRIP.AUTO-MCNC: NEGATIVE MG/DL
LDLC SERPL CALC-MCNC: 107 MG/DL (ref ?–100)
LEUKOCYTE ESTERASE UR QL STRIP.AUTO: NEGATIVE
LYMPHOCYTES # BLD AUTO: 1.18 X10(3) UL (ref 1–4)
LYMPHOCYTES NFR BLD AUTO: 26.5 %
MCH RBC QN AUTO: 29.3 PG (ref 26–34)
MCHC RBC AUTO-ENTMCNC: 31.5 G/DL (ref 31–37)
MCV RBC AUTO: 93 FL
MONOCYTES # BLD AUTO: 0.65 X10(3) UL (ref 0.1–1)
MONOCYTES NFR BLD AUTO: 14.6 %
NEUTROPHILS # BLD AUTO: 2.19 X10 (3) UL (ref 1.5–7.7)
NEUTROPHILS # BLD AUTO: 2.19 X10(3) UL (ref 1.5–7.7)
NEUTROPHILS NFR BLD AUTO: 49.3 %
NITRITE UR QL STRIP.AUTO: NEGATIVE
NONHDLC SERPL-MCNC: 131 MG/DL (ref ?–130)
OSMOLALITY SERPL CALC.SUM OF ELEC: 291 MOSM/KG (ref 275–295)
PH UR STRIP.AUTO: 5.5 [PH] (ref 5–8)
PLATELET # BLD AUTO: 245 10(3)UL (ref 150–450)
POTASSIUM SERPL-SCNC: 4.2 MMOL/L (ref 3.5–5.1)
PROT SERPL-MCNC: 7.2 G/DL (ref 6.4–8.2)
PROT UR STRIP.AUTO-MCNC: NEGATIVE MG/DL
RBC # BLD AUTO: 4.44 X10(6)UL
SODIUM SERPL-SCNC: 141 MMOL/L (ref 136–145)
SP GR UR STRIP.AUTO: 1.01 (ref 1–1.03)
TRIGL SERPL-MCNC: 139 MG/DL (ref 30–149)
TSI SER-ACNC: 2.65 MIU/ML (ref 0.36–3.74)
UROBILINOGEN UR STRIP.AUTO-MCNC: NORMAL MG/DL
VIT B12 SERPL-MCNC: 463 PG/ML (ref 193–986)
VIT D+METAB SERPL-MCNC: 31.5 NG/ML (ref 30–100)
VLDLC SERPL CALC-MCNC: 24 MG/DL (ref 0–30)
WBC # BLD AUTO: 4.5 X10(3) UL (ref 4–11)

## 2024-02-07 PROCEDURE — 85025 COMPLETE CBC W/AUTO DIFF WBC: CPT

## 2024-02-07 PROCEDURE — 80053 COMPREHEN METABOLIC PANEL: CPT

## 2024-02-07 PROCEDURE — 80061 LIPID PANEL: CPT

## 2024-02-07 PROCEDURE — 81001 URINALYSIS AUTO W/SCOPE: CPT

## 2024-02-07 PROCEDURE — 82306 VITAMIN D 25 HYDROXY: CPT

## 2024-02-07 PROCEDURE — 82607 VITAMIN B-12: CPT

## 2024-02-07 PROCEDURE — 84443 ASSAY THYROID STIM HORMONE: CPT

## 2024-02-08 DIAGNOSIS — R31.9 HEMATURIA, UNSPECIFIED TYPE: Primary | ICD-10-CM

## 2024-02-19 NOTE — PROGRESS NOTES
[facility] Progress Note      Patient Name:  Stephani Suggs  YOB: 1955  Medical Record Number:  [id number]    Encounter Date:2024      CHIEF COMPLAINT: Stage I L breast ca s/p lumpectomy     HPI:     68 year old female that I have followed since . S/p R  Lumpectomy-5 mm grade 1IDC ER/ND+, HER2 neg, neg LN. Completed 5 years of endocrine therapy in .  Presents for evaluation.    late , she is in an apartment now, working in "[employer].   SOCIAL HISTORY:  , homemaker,   late .  Son in [place].  JAKE is MD but works in management. Sister and brother live in the area.  Parents .    ROS:     Constitutional: no fever, chills fatigue,night sweats or weight loss  Neurologic: no headache, seizures, diplopia or weakness  Respiratory: no cough, SOB or hemoptysis  Cardiovascular: no chest pain, ankle swelling, ROCA  GI: no nausea, vomiting, diarrhea or BRBPR  Musculoskeletal: no body-ache or joint pain  Dermatologic: no alopecia, rash, pruritis  : no hematuria, dysuria or frequency  Psych: no confusion or depression   Heme: no easy bruising or bleeding     ALLERGIES:  No Known Allergies    MEDICATIONS:    Current Outpatient Medications   Medication Sig Dispense Refill    sertraline 25 MG Oral Tab Take 1 tablet (25 mg total) by mouth daily. 90 tablet 1    alendronate 70 MG Oral Tab Take 1 tablet (70 mg total) by mouth once a week. 12 tablet 1    Calcium Carbonate-Vitamin D (CALCIUM + D OR) Take 1 tablet by mouth 2 (two) times daily.      Multiple Vitamins-Minerals (CENTRUM SILVER ULTRA WOMENS) Oral Tab None Entered         VITALS:    Vitals:    24 0854   BP: 114/72   Pulse: 79   Resp: 16   Temp: 97.9 °F (36.6 °C)           PS:  ECO    PHYSICAL EXAM:    General: alert and oriented x 3, not in acute distress.  HEENT: JAMES, oropharynx  clear.  Neck: supple.  No JVD /adenopathy.  CVS: S1S2, regular  Rhythm, no murmurs.   Lungs:  Clear to auscultation and percussion.  Abdomen: Soft, non tender, no hepato-splenomegaly.  Extremities:  No edema.  CNS: no focal deficit  Breasts: Both are soft, no masses or axillary adenopathy.    Emotional well being: Patient's emotional well being was assessed.  No issues requiring acute psychosocial intervention were identified.     LABS:     ASSESSMENT AND PLAN:     # Stage I L breast ca s/p lumpectomy (T1N0MX). s/p L lumpectomy , RT for 5 mm gr 1 IDC, ER/ME+, her 2 neg, neg LN.  Completed 5 years of letrozole in 2/17 eventually discontinued letrozole in 5/17 by her choice.  Mammo 8/22 ok, overdue. .    # Osteoporosis: On Alendronate q week.  DEXA 10/22 showed worsening osteoporosis with T score -3.1.  PCP following.   ORDERS PLACED:      Procedures    [facility] GABI 2D+3D SCREENING BILAT (CPT=77067/29126)     Return in about 6 months (around 8/21/2024) for MD visit.    Christina Alston M.D.    [facility]    [facility] [address], [address]    2/21/2024

## 2024-02-21 ENCOUNTER — OFFICE VISIT (OUTPATIENT)
Dept: HEMATOLOGY/ONCOLOGY | Facility: HOSPITAL | Age: 69
End: 2024-02-21
Attending: INTERNAL MEDICINE
Payer: MEDICARE

## 2024-02-21 VITALS
RESPIRATION RATE: 16 BRPM | SYSTOLIC BLOOD PRESSURE: 114 MMHG | WEIGHT: 113 LBS | HEIGHT: 62.01 IN | OXYGEN SATURATION: 95 % | BODY MASS INDEX: 20.53 KG/M2 | HEART RATE: 79 BPM | DIASTOLIC BLOOD PRESSURE: 72 MMHG | TEMPERATURE: 98 F

## 2024-02-21 DIAGNOSIS — Z17.0 MALIGNANT NEOPLASM OF UPPER-INNER QUADRANT OF LEFT BREAST IN FEMALE, ESTROGEN RECEPTOR POSITIVE (HCC): ICD-10-CM

## 2024-02-21 DIAGNOSIS — C50.212 MALIGNANT NEOPLASM OF UPPER-INNER QUADRANT OF LEFT BREAST IN FEMALE, ESTROGEN RECEPTOR POSITIVE (HCC): ICD-10-CM

## 2024-02-21 DIAGNOSIS — Z12.31 VISIT FOR SCREENING MAMMOGRAM: Primary | ICD-10-CM

## 2024-02-21 PROCEDURE — 99213 OFFICE O/P EST LOW 20 MIN: CPT | Performed by: INTERNAL MEDICINE

## 2024-03-01 ENCOUNTER — HOSPITAL ENCOUNTER (OUTPATIENT)
Dept: MAMMOGRAPHY | Facility: HOSPITAL | Age: 69
Discharge: HOME | End: 2024-03-01
Attending: INTERNAL MEDICINE
Payer: MEDICARE

## 2024-03-01 DIAGNOSIS — Z12.31 VISIT FOR SCREENING MAMMOGRAM: ICD-10-CM

## 2024-03-01 PROCEDURE — 77067 SCR MAMMO BI INCL CAD: CPT | Performed by: INTERNAL MEDICINE

## 2024-03-01 PROCEDURE — 77063 BREAST TOMOSYNTHESIS BI: CPT | Performed by: INTERNAL MEDICINE

## 2024-03-04 ENCOUNTER — OFFICE VISIT (OUTPATIENT)
Dept: FAMILY MEDICINE CLINIC | Facility: CLINIC | Age: 69
End: 2024-03-04
Payer: MEDICARE

## 2024-03-04 VITALS
HEIGHT: 62 IN | DIASTOLIC BLOOD PRESSURE: 56 MMHG | RESPIRATION RATE: 16 BRPM | BODY MASS INDEX: 20.43 KG/M2 | OXYGEN SATURATION: 99 % | SYSTOLIC BLOOD PRESSURE: 100 MMHG | WEIGHT: 111 LBS | HEART RATE: 63 BPM | TEMPERATURE: 99 F

## 2024-03-04 DIAGNOSIS — J04.0 LARYNGITIS: ICD-10-CM

## 2024-03-04 DIAGNOSIS — J06.9 VIRAL UPPER RESPIRATORY TRACT INFECTION: Primary | ICD-10-CM

## 2024-03-04 NOTE — PATIENT INSTRUCTIONS
Use OTC meds for comfort as needed--  Ibuprofen/Tylenol for fever/pain  Use Benadryl at bedtime to reduce drainage and promote rest.  Zyrtec/Claritin/Allegra in the AM to reduce nasal drainage without sedation.   Use saline nasal sprays to reduce congestion and thin secretions.   Use Delsym for cough.   Consider applying roxanne's vapo-rub or eucayptus oil to chest and feet at bedtime to reduce chest and nasal congestion.   Warm tea with honey, cough lozenges, vaporizers/steam etc.  If no better in 2-3 days, follow-up with your PCP for further evaluation.

## 2024-03-04 NOTE — PROGRESS NOTES
CHIEF COMPLAINT:     Chief Complaint   Patient presents with    Sneezing     Loss of voice, sneezing, body aches x Saturday        HPI:   Stephani Suggs is a 68 year old female who presents for loss of voice since yesterday. Had sneezing and body aches that started 1 day prior.   Symptoms have been slightly better since yesterday.   Treating symptoms with otc tylenol.  Denies cough, fever, runny nose, headache. .  Sick contacts: none known  Pt denies history of allergic rhinitis.  Pt denies history of sinusitis.  Pt denies history of bronchitis.  Pt denies history of pneumonia.      Current Outpatient Medications   Medication Sig Dispense Refill    sertraline 25 MG Oral Tab Take 1 tablet (25 mg total) by mouth daily. 90 tablet 1    alendronate 70 MG Oral Tab Take 1 tablet (70 mg total) by mouth once a week. 12 tablet 1    Calcium Carbonate-Vitamin D (CALCIUM + D OR) Take 1 tablet by mouth 2 (two) times daily.      Multiple Vitamins-Minerals (CENTRUM SILVER ULTRA WOMENS) Oral Tab None Entered       No current facility-administered medications for this visit.      Past Medical History:   Diagnosis Date    Breast CA (HCC)     Cancer of breast (HCC) 2011    left breast cancer    History of bone density study     History of mammogram 10/04/10    Osteoporosis     Routine gynecological examination     Wears glasses       Past Surgical History:   Procedure Laterality Date    BIOPSY OF BREAST, NEEDLE CORE  11/16/2009    CATARACT  2005 & 2006    EYE SURGERY  8/2013    R eye with torn retina - laser surgery done    LUMPECTOMY LEFT  11/21/2011    SANJUANA BIOPSY STEREO NODULE 2 SITE BILAT (CPT=19081/41242)      OTHER SURGICAL HISTORY  2009    LEFT BREAST CYST    OTHER SURGICAL HISTORY  1999    Parathyroid Surgery    RADIATION LEFT      THYROIDECTOMY  2002         Social History     Socioeconomic History    Marital status:    Tobacco Use    Smoking status: Never    Smokeless tobacco: Never   Vaping Use    Vaping Use: Never  used   Substance and Sexual Activity    Alcohol use: Yes     Alcohol/week: 0.0 standard drinks of alcohol     Comment: rare wine    Drug use: No    Sexual activity: Yes     Partners: Male   Other Topics Concern    Caffeine Concern No     Comment: 1 cup of tea per day    Exercise Yes     Comment: Walking    Seat Belt Yes         REVIEW OF SYSTEMS:   GENERAL: feels well otherwise,   normal appetite  SKIN: no rashes or abnormal skin lesions  HEENT: See HPI  LUNGS: denies shortness of breath or wheezing, See HPI  CARDIOVASCULAR: denies chest pain or palpitations   GI: denies N/V/C or abdominal pain  NEURO: Denies headaches    EXAM:   /56   Pulse 63   Temp 98.6 °F (37 °C) (Oral)   Resp 16   Ht 5' 2\" (1.575 m)   Wt 111 lb (50.3 kg)   SpO2 99%   BMI 20.30 kg/m²   GENERAL: well developed, well nourished,in no apparent distress  SKIN: no rashes,no suspicious lesions  HEAD: atraumatic, normocephalic.  Sinuses: Non-tender .  EYES: conjunctiva clear, EOM intact  EARS: TM's pearly, no bulging, noretraction,no fluid, bony landmarks present  NOSE: Nostrils patent, clear nasal discharge, nasal mucosa pink and boggy  THROAT: Oral mucosa pink, moist. Posterior pharynx is not erythematous. no exudates. Tonsils 2/4.    NECK: Supple, non-tender  LUNGS: clear to auscultation bilaterally, no wheezes or rhonchi. Normal respiratory effort without retraction.  CARDIO: RRR without murmur  EXTREMITIES: no cyanosis, clubbing or edema  LYMPH:  no lymphadenopathy.        ASSESSMENT AND PLAN:   Stephani Suggs is a 68 year old female who presents with     ASSESSMENT:   Encounter Diagnoses   Name Primary?    Viral upper respiratory tract infection Yes    Laryngitis        PLAN: Meds as below.  Comfort care as described in Patient Instructions    Meds & Refills for this Visit:  Requested Prescriptions      No prescriptions requested or ordered in this encounter       Risks, benefits, and side effects of medication explained and  discussed.    Patient Instructions   Use OTC meds for comfort as needed--  Ibuprofen/Tylenol for fever/pain  Use Benadryl at bedtime to reduce drainage and promote rest.  Zyrtec/Claritin/Allegra in the AM to reduce nasal drainage without sedation.   Use saline nasal sprays to reduce congestion and thin secretions.   Use Delsym for cough.   Consider applying roxanne's vapo-rub or eucayptus oil to chest and feet at bedtime to reduce chest and nasal congestion.   Warm tea with honey, cough lozenges, vaporizers/steam etc.  If no better in 2-3 days, follow-up with your PCP for further evaluation.       The patient indicates understanding of these issues and agrees to the plan.

## 2024-05-30 ENCOUNTER — TELEPHONE (OUTPATIENT)
Dept: FAMILY MEDICINE CLINIC | Facility: CLINIC | Age: 69
End: 2024-05-30

## 2024-05-30 NOTE — TELEPHONE ENCOUNTER
Patient states her right hip and thigh are hurting for a few days. No fall or injury. Patient is requesting appointment

## 2024-05-31 ENCOUNTER — HOSPITAL ENCOUNTER (OUTPATIENT)
Dept: GENERAL RADIOLOGY | Age: 69
Discharge: HOME | End: 2024-05-31
Payer: MEDICARE

## 2024-05-31 ENCOUNTER — OFFICE VISIT (OUTPATIENT)
Dept: FAMILY MEDICINE CLINIC | Facility: CLINIC | Age: 69
End: 2024-05-31

## 2024-05-31 VITALS
HEIGHT: 61 IN | DIASTOLIC BLOOD PRESSURE: 58 MMHG | HEART RATE: 80 BPM | BODY MASS INDEX: 21.3 KG/M2 | WEIGHT: 112.81 LBS | SYSTOLIC BLOOD PRESSURE: 118 MMHG | TEMPERATURE: 98 F | RESPIRATION RATE: 16 BRPM

## 2024-05-31 DIAGNOSIS — Z87.39 HISTORY OF OSTEOPOROSIS: ICD-10-CM

## 2024-05-31 DIAGNOSIS — M25.551 RIGHT HIP PAIN: Primary | ICD-10-CM

## 2024-05-31 DIAGNOSIS — M25.551 RIGHT HIP PAIN: ICD-10-CM

## 2024-05-31 PROCEDURE — 73502 X-RAY EXAM HIP UNI 2-3 VIEWS: CPT

## 2024-05-31 PROCEDURE — 3078F DIAST BP <80 MM HG: CPT

## 2024-05-31 PROCEDURE — 3008F BODY MASS INDEX DOCD: CPT

## 2024-05-31 PROCEDURE — 99214 OFFICE O/P EST MOD 30 MIN: CPT

## 2024-05-31 PROCEDURE — 3074F SYST BP LT 130 MM HG: CPT

## 2024-05-31 PROCEDURE — 1159F MED LIST DOCD IN RCRD: CPT

## 2024-05-31 PROCEDURE — 1160F RVW MEDS BY RX/DR IN RCRD: CPT

## 2024-05-31 NOTE — PROGRESS NOTES
[facility] Office Note  Chief Complaint:   Chief Complaint   Patient presents with    Hip Pain       HPI:   This is a 68 year old female coming in for right hip pain which first began a few months ago. Denies any initial injury or trauma. About 10 days ago she reports she was wearing heels, and feels as if this may have exacerbated the pain. Pain is 1/10 in intensity with activity, and 0/10 at rest. At times it radiates down the back of her thigh. Denies any numbness, tingling, and weakness. Denies any back pain. She has tried topical voltaren gel and Motrin which resolve the pain.     History of osteoporosis.     Past Medical History:    Breast CA (HCC)    Cancer of breast (HCC)    left breast cancer    History of bone density study    History of mammogram    Osteoporosis    Routine gynecological examination    Wears glasses     Past Surgical History:   Procedure Laterality Date    Biopsy of breast, needle core  11/16/2009    Cataract  2005 & 2006    Eye surgery  8/2013    R eye with torn retina - laser surgery done    Lumpectomy left  11/21/2011    Florentin biopsy stereo nodule 2 site bilat (cpt=19081/93828)      Other surgical history  2009    LEFT BREAST CYST    Other surgical history  1999    Parathyroid Surgery    Radiation left      Thyroidectomy  2002     Social History:  Social History     Socioeconomic History    Marital status:    Tobacco Use    Smoking status: Never    Smokeless tobacco: Never   Vaping Use    Vaping status: Never Used   Substance and Sexual Activity    Alcohol use: Yes     Alcohol/week: 0.0 standard drinks of alcohol     Comment: rare wine    Drug use: No    Sexual activity: Yes     Partners: Male   Other Topics Concern    Caffeine Concern No     Comment: 1 cup of tea per day    Exercise Yes     Comment: Walking    Seat Belt Yes     Family History:  Family History   Problem Relation Age of Onset    Other (hyperlipidemia) Mother     Other (parkinson's ) Mother      Other (Other) Father         pneumonia    Lipids Son     Breast Cancer Self 55     Allergies:  No Known Allergies  Current Meds:  Current Outpatient Medications   Medication Sig Dispense Refill    sertraline 25 MG Oral Tab Take 1 tablet (25 mg total) by mouth daily. 90 tablet 1    alendronate 70 MG Oral Tab Take 1 tablet (70 mg total) by mouth once a week. 12 tablet 1    Calcium Carbonate-Vitamin D (CALCIUM + D OR) Take 1 tablet by mouth 2 (two) times daily.      Multiple Vitamins-Minerals (CENTRUM SILVER ULTRA WOMENS) Oral Tab None Entered        Counseling given: Not Answered       REVIEW OF SYSTEMS:   Review of Systems   Constitutional: Negative.    HENT: Negative.     Eyes: Negative.    Respiratory: Negative.     Cardiovascular: Negative.    Gastrointestinal: Negative.    Endocrine: Negative.    Genitourinary: Negative.    Musculoskeletal:  Positive for joint pain.   Skin: Negative.    Allergic/Immunologic: Negative.    Neurological: Negative.    Hematological: Negative.    Psychiatric/Behavioral: Negative.           EXAM:   /58   Pulse 80   Temp 97.7 °F (36.5 °C) (Temporal)   Resp 16   Ht 5' 1\" (1.549 m)   Wt 112 lb 12.8 oz (51.2 kg)   BMI 21.31 kg/m²  Estimated body mass index is 21.31 kg/m² as calculated from the following:    Height as of this encounter: 5' 1\" (1.549 m).    Weight as of this encounter: 112 lb 12.8 oz (51.2 kg).   Vital signs reviewed.Appears stated age, well groomed.  Physical Exam  Constitutional:       Appearance: Normal appearance.   HENT:      Head: Normocephalic and atraumatic.      Nose: Nose normal.   Cardiovascular:      Rate and Rhythm: Normal rate and regular rhythm.      Pulses: Normal pulses.      Heart sounds: Normal heart sounds.   Pulmonary:      Effort: Pulmonary effort is normal.      Breath sounds: Normal breath sounds.   Abdominal:      Tenderness: There is no right CVA tenderness or left CVA tenderness.   Musculoskeletal:         General: Normal range of  motion.      Lumbar back: Normal.      Right hip: Normal.      Left hip: No deformity, tenderness or bony tenderness. Normal range of motion. Normal strength.      Comments: Left Hip: Slight discomfort noted with JABARI test    Skin:     General: Skin is warm and dry.      Capillary Refill: Capillary refill takes less than 2 seconds.   Neurological:      General: No focal deficit present.      Mental Status: She is oriented to person, place, and time. Mental status is at baseline.      Cranial Nerves: Cranial nerves 2-12 are intact.      Sensory: Sensation is intact.      Motor: Motor function is intact.   Psychiatric:         Mood and Affect: Mood normal.         Behavior: Behavior normal.       PROCEDURE:  XR HIP W OR WO PELVIS 2 OR 3 VIEWS, RIGHT ( CPT=73502)     TECHNIQUE:  Unilateral 2 to 3 views of the hip and pelvis if performed.     COMPARISON:  None.     INDICATIONS:  Z87.39 History of osteoporosis M25.551 Right hip pain     PATIENT STATED HISTORY: (As transcribed by Technologist)  Right hip pain posteriorly for 2 months. No injury.                          Impression   CONCLUSION:       Negative for fracture or malalignment of the pelvis or hips.  Normal femoral head and acetabular morphology with preservation of hip joint spaces bilaterally.  Obturator rings are intact.  Normal sacroiliac joints and pubic symphysis.           LOCATION:  [facility]        Dictated by (CST): Jena Hammonds MD on 5/31/2024 at 12:20 PM      Finalized by (CST): Jena Hammonds MD on 5/31/2024 at 12:20 PM          ASSESSMENT AND PLAN:     1. Right hip pain  - XR HIP W OR WO PELVIS 2 OR 3 VIEWS, RIGHT ( SMH=13814)   - Physical Therapy Referral - [facility] Location    2. History of osteoporosis  - XR HIP W OR WO PELVIS 2 OR 3 VIEWS, RIGHT ( GWP=34783)   - Physical Therapy Referral - [facility] Location    Xray of right hip is negative.  Advised to schedule physical therapy.   Continue topical Voltaren gel as needed   Perform stretching exercises,  handout attached       Return if symptoms worsen or fail to improve.    Meds & Refills for this Visit:  Requested Prescriptions      No prescriptions requested or ordered in this encounter       Health Maintenance:  Health Maintenance Due   Topic Date Due    Zoster Vaccines (1 of 2) 01/08/2016    COVID-19 Vaccine (4 - 2023-24 season) 09/01/2023       Patient/Caregiver Education: Patient/Caregiver Education: There are no barriers to learning. Medical education done.   Outcome: Patient verbalizes understanding. Patient is notified to call with any questions, complications, allergies, or worsening or changing symptoms.  Patient is to call with any side effects or complications from the treatments as a result of today.     Problem List:  Patient Active Problem List   Diagnosis    Hematuria, unspecified    Anemia, unspecified    Pleurisy    Mitral valve disorder    Raynaud's syndrome    Osteoporosis    Malignant neoplasm of upper-inner quadrant of left female breast (HCC)    Elevated cholesterol    Trigger finger of right thumb    Hypercholesteremia    Stress    Special screening for malignant neoplasm of colon    Acute pain of right shoulder       Abbey Olivares, APRN    Please note that portions of this note may have been completed with a voice recognition program. Efforts were made to edit the dictations but occasionally words are mis-transcribed.    The 21st Century Cures Act makes medical notes like these available to patients in the interest of transparency. Please be advised this is a medical document. Medical documents are intended to carry relevant information, facts as evident, and the clinical opinion of the practitioner. The medical note is intended as peer to peer communication and may appear blunt or direct. It is written in medical language and may contain abbreviations or verbiage that are unfamiliar.

## 2024-06-26 ENCOUNTER — TELEPHONE (OUTPATIENT)
Dept: PHYSICAL THERAPY | Facility: HOSPITAL | Age: 69
End: 2024-06-26

## 2024-06-28 ENCOUNTER — OFFICE VISIT (OUTPATIENT)
Dept: PHYSICAL THERAPY | Age: 69
End: 2024-06-28

## 2024-06-28 DIAGNOSIS — Z87.39 HISTORY OF OSTEOPOROSIS: ICD-10-CM

## 2024-06-28 DIAGNOSIS — M25.551 RIGHT HIP PAIN: Primary | ICD-10-CM

## 2024-06-28 PROCEDURE — 97110 THERAPEUTIC EXERCISES: CPT

## 2024-06-28 PROCEDURE — 97140 MANUAL THERAPY 1/> REGIONS: CPT

## 2024-06-28 PROCEDURE — 97161 PT EVAL LOW COMPLEX 20 MIN: CPT

## 2024-06-28 NOTE — PROGRESS NOTES
LOWER EXTREMITY EVALUATION:     Diagnosis:   R hip pain, hx of osteoporosis     PT dx:  lower lumbar strain Referring Provider: Abbey Olivares  Date of Evaluation:    6/28/2024    Precautions:  Cancer, osteoporosis Next MD visit:   none scheduled  Date of Surgery: n/a     PATIENT SUMMARY   Stephani Suggs is a 68 year old female who presents to therapy today with complaints of insidious onset R buttock pain.  She notes it has improved since she has been at the doctor.  She currently mainly feels min soreness in the morning.    Pt describes pain level current 0/10, at best 1/10, at worst 1/10.   Current functional limitations include prolonged ambulation and standing and sleeping.     Stephani describes prior level of function no limitation. Pt goals include reduce and prevent soreness..  Past medical history was reviewed with Stephani. Significant findings include:  Past Medical History:    Breast CA (HCC)    Cancer of breast (HCC)    left breast cancer    History of bone density study    History of mammogram    Osteoporosis    Routine gynecological examination    Wears glasses       Past Surgical History:   Procedure Laterality Date    Biopsy of breast, needle core  11/16/2009    Cataract  2005 & 2006    Eye surgery  8/2013    R eye with torn retina - laser surgery done    Lumpectomy left  11/21/2011    Florentin biopsy stereo nodule 2 site bilat (cpt=19081/12882)      Other surgical history  2009    LEFT BREAST CYST    Other surgical history  1999    Parathyroid Surgery    Radiation left      Thyroidectomy  2002     Recent Imaging:  XR HIP W OR WO PELVIS 2 OR 3 VIEWS, RIGHT (CPT=73502)    Result Date: 5/31/2024  CONCLUSION:   Negative for fracture or malalignment of the pelvis or hips.  Normal femoral head and acetabular morphology with preservation of hip joint spaces bilaterally.  Obturator rings are intact.  Normal sacroiliac joints and pubic symphysis.    LOCATION:  [facility]   Dictated by (CST): Jena Hammonds MD on  5/31/2024 at 12:20 PM     Finalized by (CST): Jena Hammonds MD on 5/31/2024 at 12:20 PM          ASSESSMENT  Stephani presents to physical therapy evaluation with primary c/o R buttock pain. The results of the objective tests and measures show soft tissue stiffness in L5 and SI region and decreased core and hip strength.  Functional deficits include but are not limited to prolonged ambulation, standing, and sleeping..  Signs and symptoms are consistent with diagnosis of lower lumbar strain. Pt and PT discussed evaluation findings, pathology, POC and HEP.  Pt voiced understanding and performs HEP correctly without reported pain. Skilled Physical Therapy is medically necessary to address the above impairments and reach functional goals.     OBJECTIVE:   Palpation: TTP and stiffness in L5 R paraspinals and glut  Sensation: intact to gross touch, pt denies numbness/tingling in LE.  Transverse abdominal activation:  difficulty with motor control into posterior pelvic tilt         A/PROM Strength    R L R L   Lumbar         Flex WNL  Abs: 3/5      Ext WNL WNL       Sidebending WNL WNL       Rotation WNL WNL     Hip WNL WNL       Flexion   4 4     Extension   4 4     Abduction   4 4     Internal Rot 30        External Rot 60      Knee WNL WNL       Flexion   5 5     Extension   5 5   Foot / Ankle WNL WNL     * indicates pain  Accessory motion: min hypomobility R L5    Flexibility:  Hip Flexor: WNL  Hamstrings: WNL  Piriformis: WNL    Today’s Treatment and Response:   Pt education was provided on exam findings, treatment diagnosis, treatment plan, expectations, and prognosis.   Date: 6/28/2024  Tx#:   1   STM - R L5 paraspinal, glut  L Rot, Gr II-III, 1 min  R long axis traction, 30 sec x2   LTR, x20  Bridge, x10  Clam, R/L, yel, x10  March with TrA, R/L, x10                  HEP: (6/28/2024)LTR, bridge, clam R/L yel, march with TrA    Charges: PT Eval Low Complexity, 1 Man (10 min), 1 TherEx (15 min)      Total Timed Treatment:  25 min     Total Treatment Time: 40 min     PLAN OF CARE:    Goals:    (10 visits)  Pt will have no greater than 1/10 pain to ease prolonged sitting and sleeping.  Pt will have at least 4+ /5 hip strength and improved core strength to ease amb and transfers.  Pt will be knowledgeable of HEP to prevent pain and re injury.    Frequency / Duration: Patient will be seen for 1 x/week or a total of 8 visits over a 90 day period. Treatment will include: Therapeutic Exercise, Manual Therapy, Neuro Re-ed, and Modalities prn    Education or treatment limitation: None  Rehab Potential:good    LEFS Score  LEFS Score: 100 % (6/28/2024 11:00 AM)    Patient/Family/Caregiver was advised of these findings, precautions, and treatment options and has agreed to actively participate in planning and for this course of care.    Thank you for your referral. Please co-sign or sign and return this letter via fax as soon as possible to [phone]. If you have any questions, please contact me at Dept: [phone]    Sincerely,  Electronically signed by therapist: Naina Bradford, PT  Physician's certification required: Yes  I certify the need for these services furnished under this plan of treatment and while under my care.    X___________________________________________________ Date____________________    Certification From: 6/28/2024  To:9/26/2024

## 2024-07-01 ENCOUNTER — APPOINTMENT (OUTPATIENT)
Dept: PHYSICAL THERAPY | Age: 69
End: 2024-07-01
Payer: MEDICARE

## 2024-07-05 ENCOUNTER — APPOINTMENT (OUTPATIENT)
Dept: PHYSICAL THERAPY | Age: 69
End: 2024-07-05
Payer: MEDICARE

## 2024-07-08 ENCOUNTER — APPOINTMENT (OUTPATIENT)
Dept: PHYSICAL THERAPY | Age: 69
End: 2024-07-08
Payer: MEDICARE

## 2024-07-12 ENCOUNTER — APPOINTMENT (OUTPATIENT)
Dept: PHYSICAL THERAPY | Age: 69
End: 2024-07-12
Payer: MEDICARE

## 2024-08-20 DIAGNOSIS — F43.9 STRESS: ICD-10-CM

## 2024-08-20 RX ORDER — SERTRALINE HYDROCHLORIDE 25 MG/1
25 TABLET, FILM COATED ORAL DAILY
Qty: 30 TABLET | Refills: 0 | Status: SHIPPED | OUTPATIENT
Start: 2024-08-20

## 2024-08-20 NOTE — TELEPHONE ENCOUNTER
LOV:1/31/2024   for: MA-Supervisit   Medication check.   Patient advised to RTC on: 6 months (around 7/31/2024).      Medication Quantity Refills Start End   sertraline 25 MG Oral Tab 90 tablet 1 1/31/2024 --   Sig:   Take 1 tablet (25 mg total) by mouth daily.

## 2024-08-21 NOTE — PROGRESS NOTES
[facility] Progress Note      Patient Name:  Stephani Suggs  YOB: 1955  Medical Record Number:  [id number]    Encounter Date:2024      CHIEF COMPLAINT: Stage I L breast ca s/p lumpectomy     HPI:     69 year old female that I have followed since . S/p R  Lumpectomy-5 mm grade 1IDC ER/IL+, HER2 neg, neg LN. Completed 5 years of endocrine therapy in .  Presents for evaluation.    late , she is in an apartment now, working in [employer].   SOCIAL HISTORY:  , homemaker,   late .  Son in [place].  JAKE is MD but works in management. Sister and brother live in the area.  Parents .    ROS:     Constitutional: no fever, chills fatigue,night sweats or weight loss  Neurologic: no headache, seizures, diplopia or weakness  Respiratory: no cough, SOB or hemoptysis  Cardiovascular: no chest pain, ankle swelling, ROCA  GI: no nausea, vomiting, diarrhea or BRBPR  Musculoskeletal: no body-ache or joint pain  Dermatologic: no alopecia, rash, pruritis  : no hematuria, dysuria or frequency  Psych: no confusion or depression   Heme: no easy bruising or bleeding     ALLERGIES:  No Known Allergies    MEDICATIONS:    Current Outpatient Medications   Medication Sig Dispense Refill    sertraline 25 MG Oral Tab Take 1 tablet (25 mg total) by mouth daily. Schedule office visit. 30 tablet 0    alendronate 70 MG Oral Tab Take 1 tablet (70 mg total) by mouth once a week. 12 tablet 1    Calcium Carbonate-Vitamin D (CALCIUM + D OR) Take 1 tablet by mouth 2 (two) times daily.      Multiple Vitamins-Minerals (CENTRUM SILVER ULTRA WOMENS) Oral Tab None Entered         VITALS:    Vitals:    24 0847   BP: 110/72   Pulse: 88   Resp: 16   Temp: 97.2 °F (36.2 °C)             PS:  ECO    PHYSICAL EXAM:    General: alert and oriented x 3, not in acute distress.  HEENT: JAMES, oropharynx  clear.  Neck: supple.  No JVD /adenopathy.  CVS: S1S2, regular   Rhythm, no murmurs.   Lungs: Clear to auscultation and percussion.  Abdomen: Soft, non tender, no hepato-splenomegaly.  Extremities:  No edema.  CNS: no focal deficit  Breasts: Both are soft, no masses or axillary adenopathy.    Emotional well being: Patient's emotional well being was assessed.  No issues requiring acute psychosocial intervention were identified.     LABS:     ASSESSMENT AND PLAN:     # Stage I L breast ca s/p lumpectomy (T1N0MX). s/p L lumpectomy , RT for 5 mm gr 1 IDC, ER/MO+, her 2 neg, neg LN.  Completed 5 years of letrozole in 2/17 eventually discontinued letrozole in 5/17 by her choice.  Bilateral mammogram 3/24 okay, next due 3/25.    # Osteoporosis: On Alendronate q week.  DEXA 10/22 showed worsening osteoporosis with T score -3.1.  Next due 10/24.  PCP following.   ORDERS PLACED:      Procedures    XR DEXA BONE DENSITOMETRY (CPT=77080)    SANJUANA GABI 2D+3D SCREENING BILAT (CPT=77067/26731)   Return in about 6 months (around 2/23/2025) for MD visit.    Christina Alston M.D.    [facility]    [address] [place], [address]    8/23/2024

## 2024-08-23 ENCOUNTER — OFFICE VISIT (OUTPATIENT)
Dept: HEMATOLOGY/ONCOLOGY | Facility: HOSPITAL | Age: 69
End: 2024-08-23
Attending: INTERNAL MEDICINE
Payer: MEDICARE

## 2024-08-23 VITALS
BODY MASS INDEX: 19.99 KG/M2 | HEIGHT: 62.01 IN | SYSTOLIC BLOOD PRESSURE: 110 MMHG | RESPIRATION RATE: 16 BRPM | WEIGHT: 110 LBS | TEMPERATURE: 97 F | DIASTOLIC BLOOD PRESSURE: 72 MMHG | HEART RATE: 88 BPM | OXYGEN SATURATION: 97 %

## 2024-08-23 DIAGNOSIS — Z12.31 ENCOUNTER FOR MAMMOGRAM TO ESTABLISH BASELINE MAMMOGRAM: ICD-10-CM

## 2024-08-23 DIAGNOSIS — M89.9 BONE DISORDER: ICD-10-CM

## 2024-08-23 DIAGNOSIS — Z78.0 POSTMENOPAUSAL: Primary | ICD-10-CM

## 2024-08-23 DIAGNOSIS — M81.0 AGE-RELATED OSTEOPOROSIS WITHOUT CURRENT PATHOLOGICAL FRACTURE: ICD-10-CM

## 2024-08-23 PROCEDURE — 99213 OFFICE O/P EST LOW 20 MIN: CPT | Performed by: INTERNAL MEDICINE

## 2024-08-23 RX ORDER — ALENDRONATE SODIUM 70 MG/1
70 TABLET ORAL WEEKLY
Qty: 12 TABLET | Refills: 0 | Status: SHIPPED | OUTPATIENT
Start: 2024-08-23

## 2024-08-23 NOTE — TELEPHONE ENCOUNTER
LOV:  5/31/2024 for: Hip Pain   Patient advised to RTC on:  Return if symptoms worsen or fail to improve.   Medication Quantity Refills Start End   alendronate 70 MG Oral Tab 12 tablet 1 1/31/2024 --   Sig:   Take 1 tablet (70 mg total) by mouth once a week.

## 2024-08-23 NOTE — PROGRESS NOTES
Education Record    Learner:  Patient    Disease / Diagnosis: left breast cancer    Barriers / Limitations:  None   Comments:    Method:  Discussion   Comments:    General Topics:  Plan of care reviewed   Comments:    Outcome:  Shows understanding   Comments:   Breast imaging up to date.   Pt feeling well.   No new medical problems.   Does self breast exams

## 2024-08-24 DIAGNOSIS — F43.9 STRESS: ICD-10-CM

## 2024-08-26 RX ORDER — SERTRALINE HYDROCHLORIDE 25 MG/1
25 TABLET, FILM COATED ORAL DAILY
Qty: 30 TABLET | Refills: 0 | OUTPATIENT
Start: 2024-08-26

## 2024-09-01 DIAGNOSIS — M81.0 AGE-RELATED OSTEOPOROSIS WITHOUT CURRENT PATHOLOGICAL FRACTURE: ICD-10-CM

## 2024-09-01 RX ORDER — ALENDRONATE SODIUM 70 MG/1
70 TABLET ORAL WEEKLY
Qty: 12 TABLET | Refills: 0 | Status: SHIPPED | OUTPATIENT
Start: 2024-09-01

## 2024-09-26 DIAGNOSIS — F43.9 STRESS: ICD-10-CM

## 2024-09-27 ENCOUNTER — TELEPHONE (OUTPATIENT)
Dept: FAMILY MEDICINE CLINIC | Facility: CLINIC | Age: 69
End: 2024-09-27

## 2024-09-27 RX ORDER — SERTRALINE HYDROCHLORIDE 25 MG/1
25 TABLET, FILM COATED ORAL DAILY
Qty: 30 TABLET | Refills: 0 | Status: SHIPPED | OUTPATIENT
Start: 2024-09-27

## 2024-09-27 NOTE — TELEPHONE ENCOUNTER
Patient is regarding medication and a suppose urine test.     She is urgently requesting to speak to a nurse, she doesn't remember who she spoke to.     Patient is very confuse, can you please advise?

## 2024-09-27 NOTE — TELEPHONE ENCOUNTER
LOV:1/31/2024   for: MA-Supervisit  Medication check.  Patient advised to RTC on:  6 months (around 7/31/2024).   Nov:02/28/2025      Medication Quantity Refills Start End   sertraline 25 MG Oral Tab 30 tablet 0 8/20/2024 --   Sig:   Take 1 tablet (25 mg total) by mouth daily.

## 2024-09-27 NOTE — TELEPHONE ENCOUNTER
Spoke to the patient. Confirmed that Sertraline prescription refill done and sent to her pharmacy.     Informed of urinalysis result and test results done 2/7 and Dr. Gasca's recommendation back then including heart scan. Patient states she will try to do it before her upcoming appointment.    Per patient, she does have any UTI symptoms and is OK to not do the repeat urinalysis.     Informed patient of upcoming appointment. Patient verbalized understanding and agreed with plan.

## 2024-11-08 ENCOUNTER — HOSPITAL ENCOUNTER (OUTPATIENT)
Dept: BONE DENSITY | Age: 69
Discharge: HOME | End: 2024-11-08
Attending: INTERNAL MEDICINE
Payer: MEDICARE

## 2024-11-08 DIAGNOSIS — Z78.0 POSTMENOPAUSAL: ICD-10-CM

## 2024-11-08 DIAGNOSIS — M89.9 BONE DISORDER: ICD-10-CM

## 2024-11-08 PROCEDURE — 77080 DXA BONE DENSITY AXIAL: CPT | Performed by: INTERNAL MEDICINE

## 2025-01-31 PROBLEM — Z85.3 HISTORY OF BREAST CANCER: Status: ACTIVE | Noted: 2025-01-31

## 2025-02-07 ENCOUNTER — OFFICE VISIT (OUTPATIENT)
Dept: FAMILY MEDICINE CLINIC | Facility: CLINIC | Age: 70
End: 2025-02-07
Payer: MEDICARE

## 2025-02-07 VITALS
RESPIRATION RATE: 18 BRPM | HEIGHT: 62.01 IN | WEIGHT: 115 LBS | SYSTOLIC BLOOD PRESSURE: 100 MMHG | HEART RATE: 80 BPM | DIASTOLIC BLOOD PRESSURE: 60 MMHG | TEMPERATURE: 98 F | BODY MASS INDEX: 20.89 KG/M2

## 2025-02-07 DIAGNOSIS — E55.9 VITAMIN D DEFICIENCY: ICD-10-CM

## 2025-02-07 DIAGNOSIS — E78.00 HYPERCHOLESTEREMIA: ICD-10-CM

## 2025-02-07 DIAGNOSIS — Z78.9 VEGETARIAN DIET: ICD-10-CM

## 2025-02-07 DIAGNOSIS — M81.0 AGE-RELATED OSTEOPOROSIS WITHOUT CURRENT PATHOLOGICAL FRACTURE: ICD-10-CM

## 2025-02-07 DIAGNOSIS — Z00.00 ENCOUNTER FOR ANNUAL HEALTH EXAMINATION: Primary | ICD-10-CM

## 2025-02-07 DIAGNOSIS — Z85.3 HISTORY OF LEFT BREAST CANCER: ICD-10-CM

## 2025-02-07 DIAGNOSIS — F43.9 STRESS: ICD-10-CM

## 2025-02-07 RX ORDER — ALENDRONATE SODIUM 70 MG/1
70 TABLET ORAL WEEKLY
Qty: 12 TABLET | Refills: 1 | Status: SHIPPED | OUTPATIENT
Start: 2025-02-07

## 2025-02-07 RX ORDER — SERTRALINE HYDROCHLORIDE 25 MG/1
25 TABLET, FILM COATED ORAL DAILY
Qty: 90 TABLET | Refills: 1 | Status: SHIPPED | OUTPATIENT
Start: 2025-02-07

## 2025-02-07 NOTE — PATIENT INSTRUCTIONS
Shingrix-shingles shot-  Consider getting in the pharmacy.  Continue current meds.   Watch diet for fats and carbs.   Stay active.    Take calcium 1000 mg daily with vitamin D3 1000 units daily over-the-counter.  Consider getting heart scan.  Do fasting blood work and urine test next week.        Refill policies:      Allow 3 business days for refills; controlled substances may take longer.  Contact your pharmacy at least 5-7 business days prior to running out of medication and have them send an electronic request or submit through the \"request refill\" option thru your [employer] account. No need to do both, as multiple requests will create an automated [employer] message to notify of a denial for one of the duplicated requests, causing you undue confusion.   Refills are NOT addressed on weekends; covering physicians do not authorize routine medications on weekends.  No narcotics or controlled substances are refilled after noon on Fridays or by on call physicians.  By law, narcotics cannot be faxed or phoned into your pharmacy.  If your prescription is due for a refill, you may be due for a follow up appointment. Please call our office at [phone] to make an appointment or schedule an appointment via [employer].  To best provide you care, patients receiving routine medications need to be seen at least twice a year. Patients receiving narcotic/controlled substance medications need to be seen at least once every 3 months.  In the event that your preferred pharmacy does not have the requested medication in stock (ie Backordered), it is your responsibility to find another pharmacy that has the requested medication available. We will gladly send a new prescription to that pharmacy at your request.  controlled substances may not be able to be filled out of state due to license restrictions.  If you have a planned trip, it's best to call your pharmacy at least 5-7 business days to prevent any delays in your medication  refill.    Scheduling Tests:    If your physician has ordered radiology tests such as MRI or CT scans, please contact Central Scheduling at [phone] right away to schedule the test.  Once scheduled, the [facility] Centralized Referral Team will work with your insurance carrier to obtain pre-certification or prior authorization.  Depending on your insurance carrier, approval may take 3-10 days.  It is highly recommended patients assure they have received an authorization before having a test performed.  If test is done without insurance authorization, patient may be responsible for the entire amount billed.      Precertification and Prior Authorizations:  If your physician has recommended that you have a procedure or additional testing performed the [facility] Centralized Referral Team will contact your insurance carrier to obtain pre-certification or prior authorization.    You are strongly encouraged to contact your insurance carrier to verify that your procedure/test has been approved and is a COVERED benefit.  Although the [facility] Centralized Referral Team does its due diligence, the insurance carrier gives the disclaimer that \"Although the procedure is authorized, this does not guarantee payment.\"    Ultimately the patient is responsible for payment.   Thank you for your understanding in this matter.  Paperwork Completion:  If you require FMLA or disability paperwork for your recovery, please make sure to either drop it off or have it faxed to our office at [phone]. Be sure the form has your name and date of birth on it.  The form will be faxed to our Forms Department and they will complete it for you.  There is a 25$ fee for all forms that need to be filled out.  Please be aware there is a 10-14 day turnaround time.  You will need to sign a release of information (YESSICA) form if your paperwork does not come with one.  You may call the Forms Department with any questions at [phone].  Their fax number is [phone].

## 2025-02-07 NOTE — PROGRESS NOTES
Subjective:   Stephani Suggs is a 69 year old female who presents for a MA (Medicare Advantage) Supervisit (Once per calendar year) and scheduled follow up of multiple significant but stable problems and Complete work form, osteoporosis, PMS/stress, history of breast cancer .     Patient had elevated cholesterol numbers on the blood work,  we will check cholesterol levels , patient will return for blood work next week  she is watching diet ,monitor.    Vegetarian diet we will check vitamin B12 level ,will monitor.    Vitamin D deficiency check vitamin D level replace if needed,    History of left breast cancer patient is monitored closely by oncologist. Dr Alston.   Monitored by them.     Osteoporosis patient is taking Fosamax.  Doing well with medication.  Monitor DEXA scan bone density scan was ordered last year never completed.  Medication refill was called continue also  Calcium vitamin D supplementation.  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    PMS/stress patient is taking sertraline medications helping.  We will continue medication.  Patient lost her  due to lung cancer.  She has her brother and sister living in the area.  Also has her son.  Family is supportive.  Patient started a job at [employer].  Had some problems with cold hands at her work.  She is working as a  and customer service.       History/Other:   Fall Risk Assessment:   She has been screened for Falls and is low risk.      Cognitive Assessment:   She had a completely normal cognitive assessment - see flowsheet entries     Functional Ability/Status:   Stephani Suggs has a completely normal functional assessment. See flowsheet for details.          Depression Screening (PHQ-2/PHQ-9): PHQ-2 SCORE: 0  , done 2/7/2025   If you checked off any problems, how difficult have these problems made it for you to do your work, take care of things at home, or get along with other people?: Not difficult at  all        Advanced Directives:   She does NOT have a Living Will. [Do you have a living will?: No]  She does NOT have a Power of  for Health Care. [Do you have a healthcare power of ?: No]  Discussed Advance Care Planning with patient (and family/surrogate if present). Standard forms made available to patient in After Visit Summary.      Patient Active Problem List   Diagnosis    Hematuria, unspecified    Anemia, unspecified    Pleurisy    Mitral valve disorder    Raynaud's syndrome    Osteoporosis    Elevated cholesterol    Trigger finger of right thumb    Hypercholesteremia    Stress    Special screening for malignant neoplasm of colon    Acute pain of right shoulder    History of breast cancer     Allergies:  She has No Known Allergies.    Current Medications:  Outpatient Medications Marked as Taking for the 2/7/25 encounter (Office Visit) with Brittnee Gasca MD   Medication Sig    sertraline 25 MG Oral Tab Take 1 tablet (25 mg total) by mouth daily.    alendronate 70 MG Oral Tab Take 1 tablet (70 mg total) by mouth once a week. Schedule follow-up visit.    Calcium Carbonate-Vitamin D (CALCIUM + D OR) Take 1 tablet by mouth 2 (two) times daily.    Multiple Vitamins-Minerals (CENTRUM SILVER ULTRA WOMENS) Oral Tab None Entered       Medical History:  She  has a past medical history of Breast CA (HCC), Cancer of breast (HCC) (2011), History of bone density study, History of mammogram (10/04/10), Osteoporosis, Routine gynecological examination, and Wears glasses.  Surgical History:  She  has a past surgical history that includes other surgical history (2009); thyroidectomy (2002); biopsy of breast, needle core (11/16/2009); cataract (2005 & 2006); other surgical history (1999); juvencio biopsy stereo nodule 2 site bilat (cpt=19081/42798); radiation left; Eye surgery (8/2013); and lumpectomy left (11/21/2011).   Family History:  Her family history includes Breast Cancer (age of onset: 55) in her self;  Lipids in her son; Other in her father; hyperlipidemia in her mother; parkinson's in her mother.  Social History:  She  reports that she has never smoked. She has never used smokeless tobacco. She reports current alcohol use. She reports that she does not use drugs.    Tobacco:  She has never smoked tobacco.    CAGE Alcohol Screen:   CAGE screening score of 0 on 2/7/2025, showing low risk of alcohol abuse.      Patient Care Team:  Brittnee Gasca MD as PCP - General (Family Medicine)  Cornelia Jamison MD (Radiation Oncology)  Federico Mosher MD (GASTROENTEROLOGY)  Rohan Zhao DO as Consulting Physician (NEUROLOGY)  Naina Bradford PT as Physical Therapist (Physical Therapy)  Mychart, Generic Provider    Review of Systems  GENERAL: feels well otherwise  SKIN: denies any unusual skin lesions  EYES: denies blurred vision or double vision  HEENT: denies nasal congestion, sinus pain or ST  LUNGS: denies shortness of breath with exertion  CARDIOVASCULAR: denies chest pain on exertion  GI: denies abdominal pain, denies heartburn  : denies dysuria, vaginal discharge or itching, no complaint of urinary incontinence   MUSCULOSKELETAL: denies back pain,   NEURO: denies headaches  PSYCHE: denies depression or anxiety  HEMATOLOGIC: denies hx of anemia  ENDOCRINE: denies thyroid history  ALL/ASTHMA: denies hx of allergy or asthma    Objective:   Physical Exam  General Appearance:  Alert, cooperative, no distress, appears stated age   Head:  Normocephalic, without obvious abnormality, atraumatic   Eyes:  PERRL, conjunctiva/corneas clear, EOM's intact both eyes   Ears:  Normal TM's and external ear canals, both ears   Nose: Nares normal, septum midline,mucosa normal, no drainage or sinus tenderness   Throat: Lips, mucosa, and tongue normal; teeth and gums normal   Neck: Supple, symmetrical, trachea midline, no adenopathy;  thyroid: not enlarged, symmetric, no tenderness/mass/nodules;   Back:   Symmetric, no  curvature, ROM normal, no CVA tenderness   Lungs:   Clear to auscultation bilaterally, respirations unlabored   Heart:  Regular rate and rhythm, S1 and S2 normal, no murmur, rub, or gallop   Abdomen:   Soft, non-tender, bowel sounds active all four quadrants,  no masses, no organomegaly   Pelvic: Deferred  Breast deferred ,   Extremities: Extremities normal, atraumatic, no cyanosis or edema   Pulses: 2+ and symmetric   Skin: Skin color, texture, turgor normal, no rashes or lesions   Lymph nodes: Cervical, supraclavicular, and axillary nodes normal   Neurologic: Normal       /60 (BP Location: Right arm, Patient Position: Sitting, Cuff Size: adult)   Pulse 80   Temp 98.2 °F (36.8 °C) (Temporal)   Resp 18   Ht 5' 2.01\" (1.575 m)   Wt 115 lb (52.2 kg)   BMI 21.03 kg/m²  Estimated body mass index is 21.03 kg/m² as calculated from the following:    Height as of this encounter: 5' 2.01\" (1.575 m).    Weight as of this encounter: 115 lb (52.2 kg).    Medicare Hearing Assessment:   Hearing Screening    Time taken: 2/7/2025  9:37 AM  Screening Method: Finger Rub  Finger Rub Result: Pass         Visual Acuity:   Right Eye Visual Acuity: Corrected Right Eye Chart Acuity: 20/30   Left Eye Visual Acuity: Corrected Left Eye Chart Acuity: 20/30   Both Eyes Visual Acuity: Corrected Both Eyes Chart Acuity: 20/25   Able To Tolerate Visual Acuity: Yes        Assessment & Plan:   Stephani Suggs is a 69 year old female who presents for a Medicare Assessment.     1. Encounter for annual health examination (Primary)  2. Stress  -     Sertraline HCl; Take 1 tablet (25 mg total) by mouth daily.  Dispense: 90 tablet; Refill: 1  3. Age-related osteoporosis without current pathological fracture  -     Alendronate Sodium; Take 1 tablet (70 mg total) by mouth once a week. Schedule follow-up visit.  Dispense: 12 tablet; Refill: 1  4. Vitamin D deficiency  5. Hypercholesteremia  6. Vegetarian diet  7. History of left breast  cancer    Hypercholesterolemia monitor blood work.  If cholesterol numbers is elevated we will consider doing heart scan    Vegetarian diet check vitamin B12 level monitor,     Breast cancer screening -mammogram per Dr. Mosher examination per oncologist.    Vitamin D deficiency continue taking vitamin D supplement monitor    History of left breast cancer seeing specialist continue present management    Osteoporosis continue Fosamax monitor bone density scan.  Continue calcium and vitamin D supplementation, stay active.      Shingrix-shingles shot-  Consider getting in the pharmacy.  Continue current meds.   Watch diet for fats and carbs.   Stay active.    Take calcium 1000 mg daily with vitamin D3 1000 units daily over-the-counter.  Consider getting heart scan.  Do fasting blood work and urine test next week.    The patient indicates understanding of these issues and agrees to the plan.  Imaging studies ordered.  Lab work ordered.  Reinforced healthy diet, lifestyle, and exercise.      No follow-ups on file.     Brittnee aGsca MD, 5/10/2023     Supplementary Documentation:   General Health:  In the past six months, have you lost more than 10 pounds without trying?: 2 - No  Has your appetite been poor?: No  Type of Diet: Vegetarian  How does the patient maintain a good energy level?: Daily Walks  How would you describe your daily physical activity?: Moderate  How would you describe your current health state?: Good  How do you maintain positive mental well-being?: Visiting Family;Visiting Friends  On a scale of 0 to 10, with 0 being no pain and 10 being severe pain, what is your pain level?: 0 - (None)  In the past six months, have you experienced urine leakage?: 0-No  At any time do you feel concerned for the safety/well-being of yourself and/or your children, in your home or elsewhere?: Yes  Have you had any immunizations at another office such as Influenza, Hepatitis B, Tetanus, or Pneumococcal?: Yes        Stephani Suggs's SCREENING SCHEDULE   Tests on this list are recommended by your physician but may not be covered, or covered at this frequency, by your insurer.   Please check with your insurance carrier before scheduling to verify coverage.   PREVENTATIVE SERVICES FREQUENCY &  COVERAGE DETAILS LAST COMPLETION DATE   Diabetes Screening    Fasting Blood Sugar /  Glucose    One screening every 12 months if never tested or if previously tested but not diagnosed with pre-diabetes   One screening every 6 months if diagnosed with pre-diabetes Lab Results   Component Value Date    GLU 86 02/07/2024        Cardiovascular Disease Screening    Lipid Panel  Cholesterol  Lipoprotein (HDL)  Triglycerides Covered every 5 years for all Medicare beneficiaries without apparent signs or symptoms of cardiovascular disease Lab Results   Component Value Date    CHOLEST 207 (H) 02/07/2024    HDL 76 (H) 02/07/2024     (H) 02/07/2024    TRIG 139 02/07/2024         Electrocardiogram (EKG)   Covered if needed at Welcome to Medicare, and non-screening if indicated for medical reasons 12/02/2014      Ultrasound Screening for Abdominal Aortic Aneurysm (AAA) Covered once in a lifetime for one of the following risk factors    Men who are 65-75 years old and have ever smoked    Anyone with a family history -     Colorectal Cancer Screening  Covered for ages 50-85; only need ONE of the following:    Colonoscopy   Covered every 10 years    Covered every 2 years if patient is at high risk or previous colonoscopy was abnormal 01/18/2021    Health Maintenance   Topic Date Due    Colorectal Cancer Screening  01/18/2031       Flexible Sigmoidoscopy   Covered every 4 years -    Fecal Occult Blood Test Covered annually -   Bone Density Screening    Bone density screening    Covered every 2 years after age 65 if diagnosed with risk of osteoporosis or estrogen deficiency.    Covered yearly for long-term glucocorticoid medication use (Steroids)  Last Dexa Scan:    XR DEXA BONE DENSITOMETRY (CPT=77080) 11/08/2024      No recommendations at this time   Pap and Pelvic    Pap   Covered every 2 years for women at normal risk; Annually if at high risk 02/18/2020  No recommendations at this time    Chlamydia Annually if high risk -  No recommendations at this time   Screening Mammogram    Mammogram     Recommend annually for all female patients aged 40 and older    One baseline mammogram covered for patients aged 35-39 03/01/2024    Health Maintenance   Topic Date Due    Mammogram  03/01/2025       Immunizations    Influenza Covered once per flu season  Please get every year 09/12/2024  No recommendations at this time    Pneumococcal Each vaccine (Crippre96 & Lompnovqs04) covered once after 65 Prevnar 13: 09/29/2020    [id number]: 06/15/2022     No recommendations at this time    Hepatitis B One screening covered for patients with certain risk factors   -  No recommendations at this time    Tetanus Toxoid Not covered by Medicare Part B unless medically necessary (cut with metal); may be covered with your pharmacy prescription benefits -    Tetanus, Diptheria and Pertusis TD and TDaP Not covered by Medicare Part B -  No recommendations at this time    Zoster Not covered by Medicare Part B; may be covered with your pharmacy  prescription benefits 11/13/2015  Zoster Vaccines(1 of 2) due on 01/08/2016

## 2025-02-14 ENCOUNTER — LAB ENCOUNTER (OUTPATIENT)
Dept: LAB | Age: 70
End: 2025-02-14
Attending: FAMILY MEDICINE
Payer: MEDICARE

## 2025-02-14 DIAGNOSIS — E55.9 VITAMIN D DEFICIENCY: ICD-10-CM

## 2025-02-14 DIAGNOSIS — E78.00 HYPERCHOLESTEREMIA: ICD-10-CM

## 2025-02-14 DIAGNOSIS — Z78.9 VEGETARIAN DIET: ICD-10-CM

## 2025-02-14 DIAGNOSIS — M81.0 AGE-RELATED OSTEOPOROSIS WITHOUT CURRENT PATHOLOGICAL FRACTURE: ICD-10-CM

## 2025-02-14 LAB
ALBUMIN SERPL-MCNC: 4.4 G/DL (ref 3.2–4.8)
ALBUMIN/GLOB SERPL: 1.6 {RATIO} (ref 1–2)
ALP LIVER SERPL-CCNC: 95 U/L
ALT SERPL-CCNC: 9 U/L
ANION GAP SERPL CALC-SCNC: 9 MMOL/L (ref 0–18)
AST SERPL-CCNC: 29 U/L (ref ?–34)
BASOPHILS # BLD AUTO: 0.03 X10(3) UL (ref 0–0.2)
BASOPHILS NFR BLD AUTO: 0.4 %
BILIRUB SERPL-MCNC: 0.5 MG/DL (ref 0.2–1.1)
BILIRUB UR QL STRIP.AUTO: NEGATIVE
BUN BLD-MCNC: 14 MG/DL (ref 9–23)
CALCIUM BLD-MCNC: 9.4 MG/DL (ref 8.7–10.6)
CHLORIDE SERPL-SCNC: 104 MMOL/L (ref 98–112)
CHOLEST SERPL-MCNC: 209 MG/DL (ref ?–200)
CLARITY UR REFRACT.AUTO: CLEAR
CO2 SERPL-SCNC: 27 MMOL/L (ref 21–32)
CREAT BLD-MCNC: 0.86 MG/DL
EGFRCR SERPLBLD CKD-EPI 2021: 73 ML/MIN/1.73M2 (ref 60–?)
EOSINOPHIL # BLD AUTO: 0.57 X10(3) UL (ref 0–0.7)
EOSINOPHIL NFR BLD AUTO: 8.5 %
ERYTHROCYTE [DISTWIDTH] IN BLOOD BY AUTOMATED COUNT: 11.4 %
FASTING PATIENT LIPID ANSWER: YES
FASTING STATUS PATIENT QL REPORTED: YES
GLOBULIN PLAS-MCNC: 2.7 G/DL (ref 2–3.5)
GLUCOSE BLD-MCNC: 83 MG/DL (ref 70–99)
GLUCOSE UR STRIP.AUTO-MCNC: NORMAL MG/DL
HCT VFR BLD AUTO: 41 %
HDLC SERPL-MCNC: 59 MG/DL (ref 40–59)
HGB BLD-MCNC: 12.6 G/DL
IMM GRANULOCYTES # BLD AUTO: 0.03 X10(3) UL (ref 0–1)
IMM GRANULOCYTES NFR BLD: 0.4 %
KETONES UR STRIP.AUTO-MCNC: NEGATIVE MG/DL
LDLC SERPL CALC-MCNC: 125 MG/DL (ref ?–100)
LEUKOCYTE ESTERASE UR QL STRIP.AUTO: NEGATIVE
LYMPHOCYTES # BLD AUTO: 1.99 X10(3) UL (ref 1–4)
LYMPHOCYTES NFR BLD AUTO: 29.6 %
MCH RBC QN AUTO: 29 PG (ref 26–34)
MCHC RBC AUTO-ENTMCNC: 30.7 G/DL (ref 31–37)
MCV RBC AUTO: 94.5 FL
MONOCYTES # BLD AUTO: 0.8 X10(3) UL (ref 0.1–1)
MONOCYTES NFR BLD AUTO: 11.9 %
NEUTROPHILS # BLD AUTO: 3.31 X10 (3) UL (ref 1.5–7.7)
NEUTROPHILS # BLD AUTO: 3.31 X10(3) UL (ref 1.5–7.7)
NEUTROPHILS NFR BLD AUTO: 49.2 %
NITRITE UR QL STRIP.AUTO: NEGATIVE
NONHDLC SERPL-MCNC: 150 MG/DL (ref ?–130)
OSMOLALITY SERPL CALC.SUM OF ELEC: 290 MOSM/KG (ref 275–295)
PH UR STRIP.AUTO: 5.5 [PH] (ref 5–8)
PLATELET # BLD AUTO: 377 10(3)UL (ref 150–450)
POTASSIUM SERPL-SCNC: 4.1 MMOL/L (ref 3.5–5.1)
PROT SERPL-MCNC: 7.1 G/DL (ref 5.7–8.2)
PROT UR STRIP.AUTO-MCNC: NEGATIVE MG/DL
RBC # BLD AUTO: 4.34 X10(6)UL
SODIUM SERPL-SCNC: 140 MMOL/L (ref 136–145)
SP GR UR STRIP.AUTO: 1.01 (ref 1–1.03)
TRIGL SERPL-MCNC: 141 MG/DL (ref 30–149)
TSI SER-ACNC: 2.65 UIU/ML (ref 0.55–4.78)
UROBILINOGEN UR STRIP.AUTO-MCNC: NORMAL MG/DL
VIT B12 SERPL-MCNC: 609 PG/ML (ref 211–911)
VIT D+METAB SERPL-MCNC: 56.2 NG/ML (ref 30–100)
VLDLC SERPL CALC-MCNC: 25 MG/DL (ref 0–30)
WBC # BLD AUTO: 6.7 X10(3) UL (ref 4–11)

## 2025-02-14 PROCEDURE — 80053 COMPREHEN METABOLIC PANEL: CPT

## 2025-02-14 PROCEDURE — 85025 COMPLETE CBC W/AUTO DIFF WBC: CPT

## 2025-02-14 PROCEDURE — 84443 ASSAY THYROID STIM HORMONE: CPT

## 2025-02-14 PROCEDURE — 82306 VITAMIN D 25 HYDROXY: CPT

## 2025-02-14 PROCEDURE — 80061 LIPID PANEL: CPT

## 2025-02-14 PROCEDURE — 81001 URINALYSIS AUTO W/SCOPE: CPT

## 2025-02-14 PROCEDURE — 82607 VITAMIN B-12: CPT

## 2025-02-14 PROCEDURE — 36415 COLL VENOUS BLD VENIPUNCTURE: CPT

## 2025-02-26 ENCOUNTER — TELEPHONE (OUTPATIENT)
Age: 70
End: 2025-02-26

## 2025-02-28 ENCOUNTER — APPOINTMENT (OUTPATIENT)
Age: 70
End: 2025-02-28
Attending: INTERNAL MEDICINE
Payer: MEDICARE

## 2025-03-05 NOTE — PROGRESS NOTES
Cancer Center Progress Note      Patient Name:  Stephani Suggs  YOB: 1955  Medical Record Number:  [id number]    Encounter Date:3/7/2025    CHIEF COMPLAINT: Stage I L breast ca s/p lumpectomy     HPI:     69 year old female that I have followed since . S/p R  Lumpectomy-5 mm grade 1IDC ER/AL+, HER2 neg, neg LN. Completed 5 years of endocrine therapy in .  Presents for evaluation.    late , she is in an apartment now, working in [employer].  No new complaints.  SOCIAL HISTORY:  , homemaker,   late .  Son in [place].  JAKE is MD but works in management. Sister and brother live in the area.  Parents .    ROS:     Constitutional: no fever, chills fatigue,night sweats or weight loss  Neurologic: no headache, seizures, diplopia or weakness  Respiratory: no cough, SOB or hemoptysis  Cardiovascular: no chest pain, ankle swelling, ROCA  GI: no nausea, vomiting, diarrhea or BRBPR  Musculoskeletal: no body-ache or joint pain  Dermatologic: no alopecia, rash, pruritis  : no hematuria, dysuria or frequency  Psych: no confusion or depression   Heme: no easy bruising or bleeding     ALLERGIES:  No Known Allergies    MEDICATIONS:    Current Outpatient Medications   Medication Sig Dispense Refill    sertraline 25 MG Oral Tab Take 1 tablet (25 mg total) by mouth daily. 90 tablet 1    alendronate 70 MG Oral Tab Take 1 tablet (70 mg total) by mouth once a week. Schedule follow-up visit. 12 tablet 1    Calcium Carbonate-Vitamin D (CALCIUM + D OR) Take 1 tablet by mouth 2 (two) times daily.      Multiple Vitamins-Minerals (CENTRUM SILVER ULTRA WOMENS) Oral Tab None Entered         VITALS:    Vitals:    25 0930   BP: 106/70   Pulse: 76   Resp: 18   Temp: 98.2 °F (36.8 °C)               PS:  ECO    PHYSICAL EXAM:    General: alert and oriented x 3, not in acute distress.  HEENT: JAMES, oropharynx  clear.  Neck: supple.  No JVD /adenopathy.  CVS: S1S2,  regular  Rhythm, no murmurs.   Lungs: Clear to auscultation and percussion.  Abdomen: Soft, non tender, no hepato-splenomegaly.  Extremities:  No edema.  CNS: no focal deficit  Breasts: Both are soft, no masses or axillary adenopathy.    Emotional well being: Patient's emotional well being was assessed.  No issues requiring acute psychosocial intervention were identified.     LABS:     ASSESSMENT AND PLAN:     # Stage I L breast ca s/p lumpectomy (T1N0MX). s/p L lumpectomy , RT for 5 mm gr 1 IDC, ER/MN+, her 2 neg, neg LN.  Completed 5 years of letrozole in 2/17 eventually discontinued letrozole in 5/17 by her choice.  Bilateral mammogram 3/24 okay, next due 3/25.   In light of history and increased Kimberley's density, recommended additional imaging.  She is comfortable with breast ultrasound to be done 6 months after mammogram.  # Osteoporosis: On Alendronate q week.  DEXA 11/24 showed stable/better osteoporosis with T score -3.1.  PCP following.   ORDERS PLACED:      Procedures    US BREAST BILATERAL COMPLETE (CPT=76641-50)     Return in about 6 months (around 9/7/2025) for MD visit.    Christina Alston M.D.    Christina Alston M.D.    [facility]    [facility]  [address] [place] [address] [place], [address]      3/7/2025

## 2025-03-07 ENCOUNTER — OFFICE VISIT (OUTPATIENT)
Age: 70
End: 2025-03-07
Attending: INTERNAL MEDICINE
Payer: MEDICARE

## 2025-03-07 VITALS
TEMPERATURE: 98 F | BODY MASS INDEX: 20.81 KG/M2 | DIASTOLIC BLOOD PRESSURE: 70 MMHG | OXYGEN SATURATION: 99 % | HEIGHT: 62.01 IN | RESPIRATION RATE: 18 BRPM | WEIGHT: 114.5 LBS | HEART RATE: 76 BPM | SYSTOLIC BLOOD PRESSURE: 106 MMHG

## 2025-03-07 DIAGNOSIS — C50.212 MALIGNANT NEOPLASM OF UPPER-INNER QUADRANT OF LEFT BREAST IN FEMALE, ESTROGEN RECEPTOR POSITIVE (HCC): Primary | ICD-10-CM

## 2025-03-07 DIAGNOSIS — R92.30 DENSE BREASTS: ICD-10-CM

## 2025-03-07 DIAGNOSIS — Z17.0 MALIGNANT NEOPLASM OF UPPER-INNER QUADRANT OF LEFT BREAST IN FEMALE, ESTROGEN RECEPTOR POSITIVE (HCC): Primary | ICD-10-CM

## 2025-03-07 NOTE — PROGRESS NOTES
Education Record    Learner:  Patient    Disease / Diagnosis: hx left breast cancer     Barriers / Limitations:  None   Comments:    Method:  Discussion   Comments:    General Topics:  Plan of care reviewed   Comments:    Outcome:  Shows understanding   Comments:   Completed AI therapy.   Breast imaging up to date.   Pt feeling well, no new medical problems.

## 2025-03-12 ENCOUNTER — APPOINTMENT (OUTPATIENT)
Age: 70
End: 2025-03-12
Attending: INTERNAL MEDICINE
Payer: MEDICARE

## 2025-03-15 ENCOUNTER — HOSPITAL ENCOUNTER (OUTPATIENT)
Dept: MAMMOGRAPHY | Facility: HOSPITAL | Age: 70
Discharge: HOME | End: 2025-03-15
Attending: INTERNAL MEDICINE
Payer: MEDICARE

## 2025-03-15 DIAGNOSIS — Z12.31 ENCOUNTER FOR MAMMOGRAM TO ESTABLISH BASELINE MAMMOGRAM: ICD-10-CM

## 2025-03-15 PROCEDURE — 77067 SCR MAMMO BI INCL CAD: CPT | Performed by: INTERNAL MEDICINE

## 2025-03-15 PROCEDURE — 77063 BREAST TOMOSYNTHESIS BI: CPT | Performed by: INTERNAL MEDICINE

## (undated) NOTE — LETTER
05/24/21        Stephani Suggs  [address] [id number]      Dear Mary Cardenas records indicate that you have outstanding lab work and or testing that was ordered for you and has not yet been completed:  Orders Placed This Encounte

## (undated) NOTE — MR AVS SNAPSHOT
After Visit Summary   4/19/2017    Joan Quispe    MRN: [id number]           Diagnoses this Visit     Malignant neoplasm of upper-inner quadrant of left female breast ([facility] [address]    -  Primary     Osteoporosis, unspecified osteoporosis type, unspecif information, go to [email or web address] and click on the Sign Up Now link in the [employer] box. Enter your [employer] Activation Code exactly as it appears below along with your Zip Code and Date of Birth to complete the sign-up process.  If you do

## (undated) NOTE — LETTER
8/14/2017    Stephani Suggs  [address] [phone]    Dear Ms. Isabella Bates office has been trying to contact you to discuss your recent test results or you are due for an appointment with your provider.   It is important that we r

## (undated) NOTE — LETTER
Date: 1/31/2024    Patient Name: Stephani Suggs          To Whom it may concern:    This letter has been written at the patient's request. The above patient was seen at the [facility] for follow-up visit.  She reported that she is having Raynaud's syndrome.        Sincerely,      Brittnee Gasca MD

## (undated) NOTE — LETTER
3/12/2019    Stephani Suggs  [address] [phone]    Dear Ms. Ross Torre office has been trying to contact you to schedule a visit with your doctor to address important healthcare needs.   It is important that you contact our of